# Patient Record
Sex: FEMALE | Race: WHITE | NOT HISPANIC OR LATINO
[De-identification: names, ages, dates, MRNs, and addresses within clinical notes are randomized per-mention and may not be internally consistent; named-entity substitution may affect disease eponyms.]

---

## 2017-06-14 ENCOUNTER — APPOINTMENT (OUTPATIENT)
Dept: HEMATOLOGY ONCOLOGY | Facility: CLINIC | Age: 27
End: 2017-06-14

## 2017-06-14 ENCOUNTER — RESULT REVIEW (OUTPATIENT)
Age: 27
End: 2017-06-14

## 2017-06-14 ENCOUNTER — OUTPATIENT (OUTPATIENT)
Dept: OUTPATIENT SERVICES | Facility: HOSPITAL | Age: 27
LOS: 1 days | Discharge: HOME | End: 2017-06-14

## 2017-06-14 VITALS
HEIGHT: 62.2 IN | TEMPERATURE: 99 F | BODY MASS INDEX: 24.65 KG/M2 | OXYGEN SATURATION: 97 % | WEIGHT: 135.67 LBS | DIASTOLIC BLOOD PRESSURE: 92 MMHG | HEART RATE: 88 BPM | SYSTOLIC BLOOD PRESSURE: 134 MMHG | RESPIRATION RATE: 16 BRPM

## 2017-06-14 DIAGNOSIS — D69.6 THROMBOCYTOPENIA, UNSPECIFIED: ICD-10-CM

## 2017-06-14 DIAGNOSIS — R79.89 OTHER SPECIFIED ABNORMAL FINDINGS OF BLOOD CHEMISTRY: ICD-10-CM

## 2017-06-14 LAB
BASOPHILS # BLD AUTO: 0.1 K/UL — SIGNIFICANT CHANGE UP (ref 0–0.2)
BASOPHILS NFR BLD AUTO: 1.2 % — SIGNIFICANT CHANGE UP (ref 0–2)
EOSINOPHIL # BLD AUTO: 0.3 K/UL — SIGNIFICANT CHANGE UP (ref 0–0.5)
EOSINOPHIL NFR BLD AUTO: 3.7 % — SIGNIFICANT CHANGE UP (ref 0–6)
HCT VFR BLD CALC: 48.3 % — HIGH (ref 34.5–45)
HGB BLD-MCNC: 16 G/DL — HIGH (ref 11.5–15.5)
LYMPHOCYTES # BLD AUTO: 2.4 K/UL — SIGNIFICANT CHANGE UP (ref 1–3.3)
LYMPHOCYTES # BLD AUTO: 29.6 % — SIGNIFICANT CHANGE UP (ref 13–44)
MCHC RBC-ENTMCNC: 27.6 PG — SIGNIFICANT CHANGE UP (ref 27–34)
MCHC RBC-ENTMCNC: 33 GM/DL — SIGNIFICANT CHANGE UP (ref 32–36)
MCV RBC AUTO: 83.5 FL — SIGNIFICANT CHANGE UP (ref 80–100)
MONOCYTES # BLD AUTO: 0.3 K/UL — SIGNIFICANT CHANGE UP (ref 0–0.9)
MONOCYTES NFR BLD AUTO: 4 % — SIGNIFICANT CHANGE UP (ref 2–14)
NEUTROPHILS # BLD AUTO: 5 K/UL — SIGNIFICANT CHANGE UP (ref 1.8–7.4)
NEUTROPHILS NFR BLD AUTO: 61.6 % — SIGNIFICANT CHANGE UP (ref 43–77)
PLATELET # BLD AUTO: 58 K/UL — LOW (ref 150–400)
RBC # BLD: 5.79 M/UL — HIGH (ref 3.8–5.2)
RBC # FLD: 11.1 % — SIGNIFICANT CHANGE UP (ref 10.3–14.5)
WBC # BLD: 8.1 K/UL — SIGNIFICANT CHANGE UP (ref 3.8–10.5)
WBC # FLD AUTO: 8.1 K/UL — SIGNIFICANT CHANGE UP (ref 3.8–10.5)

## 2018-08-03 ENCOUNTER — APPOINTMENT (OUTPATIENT)
Dept: ULTRASOUND IMAGING | Facility: CLINIC | Age: 28
End: 2018-08-03
Payer: COMMERCIAL

## 2018-08-03 ENCOUNTER — OUTPATIENT (OUTPATIENT)
Dept: OUTPATIENT SERVICES | Facility: HOSPITAL | Age: 28
LOS: 1 days | End: 2018-08-03
Payer: COMMERCIAL

## 2018-08-03 DIAGNOSIS — R92.8 OTHER ABNORMAL AND INCONCLUSIVE FINDINGS ON DIAGNOSTIC IMAGING OF BREAST: ICD-10-CM

## 2018-08-03 PROCEDURE — 76642 ULTRASOUND BREAST LIMITED: CPT | Mod: 26,LT

## 2018-08-03 PROCEDURE — 76642 ULTRASOUND BREAST LIMITED: CPT

## 2018-11-21 ENCOUNTER — TRANSCRIPTION ENCOUNTER (OUTPATIENT)
Age: 28
End: 2018-11-21

## 2018-11-24 ENCOUNTER — OUTPATIENT (OUTPATIENT)
Dept: OUTPATIENT SERVICES | Facility: HOSPITAL | Age: 28
LOS: 1 days | End: 2018-11-24
Payer: COMMERCIAL

## 2018-11-24 ENCOUNTER — APPOINTMENT (OUTPATIENT)
Dept: ULTRASOUND IMAGING | Facility: CLINIC | Age: 28
End: 2018-11-24
Payer: COMMERCIAL

## 2018-11-24 DIAGNOSIS — R10.9 UNSPECIFIED ABDOMINAL PAIN: ICD-10-CM

## 2018-11-24 PROCEDURE — 76700 US EXAM ABDOM COMPLETE: CPT | Mod: 26

## 2018-11-24 PROCEDURE — 76700 US EXAM ABDOM COMPLETE: CPT

## 2019-03-12 ENCOUNTER — TRANSCRIPTION ENCOUNTER (OUTPATIENT)
Age: 29
End: 2019-03-12

## 2019-07-01 ENCOUNTER — TRANSCRIPTION ENCOUNTER (OUTPATIENT)
Age: 29
End: 2019-07-01

## 2020-01-06 ENCOUNTER — EMERGENCY (EMERGENCY)
Facility: HOSPITAL | Age: 30
LOS: 1 days | Discharge: HOME | End: 2020-01-06
Attending: EMERGENCY MEDICINE
Payer: COMMERCIAL

## 2020-01-06 VITALS
DIASTOLIC BLOOD PRESSURE: 103 MMHG | RESPIRATION RATE: 18 BRPM | WEIGHT: 138.01 LBS | TEMPERATURE: 99 F | HEIGHT: 62 IN | HEART RATE: 84 BPM | OXYGEN SATURATION: 98 % | SYSTOLIC BLOOD PRESSURE: 160 MMHG

## 2020-01-06 VITALS
RESPIRATION RATE: 18 BRPM | DIASTOLIC BLOOD PRESSURE: 80 MMHG | SYSTOLIC BLOOD PRESSURE: 114 MMHG | HEART RATE: 88 BPM | OXYGEN SATURATION: 98 %

## 2020-01-06 LAB
BASOPHILS # BLD AUTO: 0.07 K/UL — SIGNIFICANT CHANGE UP (ref 0–0.2)
BASOPHILS NFR BLD AUTO: 1.4 % — SIGNIFICANT CHANGE UP (ref 0–2)
EOSINOPHIL # BLD AUTO: 0.18 K/UL — SIGNIFICANT CHANGE UP (ref 0–0.5)
EOSINOPHIL NFR BLD AUTO: 3.5 % — SIGNIFICANT CHANGE UP (ref 0–6)
HCT VFR BLD CALC: 42.8 % — SIGNIFICANT CHANGE UP (ref 34.5–45)
HGB BLD-MCNC: 14.5 G/DL — SIGNIFICANT CHANGE UP (ref 11.5–15.5)
IMM GRANULOCYTES NFR BLD AUTO: 0.2 % — SIGNIFICANT CHANGE UP (ref 0–1.5)
LYMPHOCYTES # BLD AUTO: 1.25 K/UL — SIGNIFICANT CHANGE UP (ref 1–3.3)
LYMPHOCYTES # BLD AUTO: 24.3 % — SIGNIFICANT CHANGE UP (ref 13–44)
MCHC RBC-ENTMCNC: 28.5 PG — SIGNIFICANT CHANGE UP (ref 27–34)
MCHC RBC-ENTMCNC: 33.9 GM/DL — SIGNIFICANT CHANGE UP (ref 32–36)
MCV RBC AUTO: 84.3 FL — SIGNIFICANT CHANGE UP (ref 80–100)
MONOCYTES # BLD AUTO: 0.3 K/UL — SIGNIFICANT CHANGE UP (ref 0–0.9)
MONOCYTES NFR BLD AUTO: 5.8 % — SIGNIFICANT CHANGE UP (ref 2–14)
NEUTROPHILS # BLD AUTO: 3.33 K/UL — SIGNIFICANT CHANGE UP (ref 1.8–7.4)
NEUTROPHILS NFR BLD AUTO: 64.8 % — SIGNIFICANT CHANGE UP (ref 43–77)
PLATELET # BLD AUTO: 61 K/UL — LOW (ref 150–400)
RBC # BLD: 5.08 M/UL — SIGNIFICANT CHANGE UP (ref 3.8–5.2)
RBC # FLD: 12.2 % — SIGNIFICANT CHANGE UP (ref 10.3–14.5)
WBC # BLD: 5.14 K/UL — SIGNIFICANT CHANGE UP (ref 3.8–10.5)
WBC # FLD AUTO: 5.14 K/UL — SIGNIFICANT CHANGE UP (ref 3.8–10.5)

## 2020-01-06 PROCEDURE — 70450 CT HEAD/BRAIN W/O DYE: CPT

## 2020-01-06 PROCEDURE — 96374 THER/PROPH/DIAG INJ IV PUSH: CPT

## 2020-01-06 PROCEDURE — 99284 EMERGENCY DEPT VISIT MOD MDM: CPT | Mod: 25

## 2020-01-06 PROCEDURE — 36415 COLL VENOUS BLD VENIPUNCTURE: CPT

## 2020-01-06 PROCEDURE — 96361 HYDRATE IV INFUSION ADD-ON: CPT

## 2020-01-06 PROCEDURE — 99284 EMERGENCY DEPT VISIT MOD MDM: CPT

## 2020-01-06 PROCEDURE — 85027 COMPLETE CBC AUTOMATED: CPT

## 2020-01-06 PROCEDURE — 70450 CT HEAD/BRAIN W/O DYE: CPT | Mod: 26

## 2020-01-06 RX ORDER — METOCLOPRAMIDE HCL 10 MG
10 TABLET ORAL ONCE
Refills: 0 | Status: COMPLETED | OUTPATIENT
Start: 2020-01-06 | End: 2020-01-06

## 2020-01-06 RX ORDER — SODIUM CHLORIDE 9 MG/ML
1000 INJECTION INTRAMUSCULAR; INTRAVENOUS; SUBCUTANEOUS ONCE
Refills: 0 | Status: COMPLETED | OUTPATIENT
Start: 2020-01-06 | End: 2020-01-06

## 2020-01-06 RX ADMIN — SODIUM CHLORIDE 1000 MILLILITER(S): 9 INJECTION INTRAMUSCULAR; INTRAVENOUS; SUBCUTANEOUS at 07:50

## 2020-01-06 RX ADMIN — Medication 10 MILLIGRAM(S): at 08:36

## 2020-01-06 RX ADMIN — SODIUM CHLORIDE 1000 MILLILITER(S): 9 INJECTION INTRAMUSCULAR; INTRAVENOUS; SUBCUTANEOUS at 08:33

## 2020-01-06 NOTE — ED PROVIDER NOTE - CARE PROVIDER_API CALL
Timothy Henry; PhD)  Neurology; Vascular Neurology  [address] [place] [address]  Phone: [phone]  Fax: [phone]  Follow Up Time: Routine

## 2020-01-06 NOTE — ED PROVIDER NOTE - CLINICAL SUMMARY MEDICAL DECISION MAKING FREE TEXT BOX
30 y/o F neurologically intact, will check platelet count, hydrate and medicate with reglan, reassess.

## 2020-01-06 NOTE — ED PROVIDER NOTE - PATIENT PORTAL LINK FT
You can access the FollowMyHealth Patient Portal offered by [facility] by registering at the following website: [email or web address]. By joining [employer]’s FollowMyHealth portal, you will also be able to view your health information using other applications (apps) compatible with our system.

## 2020-01-06 NOTE — ED PROVIDER NOTE - ATTENDING CONTRIBUTION TO CARE
Priya: I performed a face to face bedside interview with patient regarding history of present illness, review of symptoms and past medical history. I completed an independent physical exam.  I have discussed patient's plan of care with advanced care provider.   I agree with note as stated above including HISTORY OF PRESENT ILLNESS, HIV, PAST MEDICAL/SURGICAL/FAMILY/SOCIAL HISTORY, ALLERGIES AND HOME MEDICATIONS, REVIEW OF SYSTEMS, PHYSICAL EXAM, MEDICAL DECISION MAKING and any PROGRESS NOTES during the time I functioned as the attending physician for this patient  unless otherwise noted. My brief assessment is as follows: hx itp (no treatment, platelets 50k most recently), c/o gradual onset headache intermittently over past few days, worse today. No trauma, no photophobia or neck stiffness. States has some tenderness to back of headache after doing stretching/work out. No n/v/f/c. No neuro symptoms. on exam, very comfortable/well appearing, nad, supple neck, perrla, mild ttp to occiput, cn 2-12 wnl, nl sensation/motor. nl gait, ctab, rrr, abd nt/nd. ct head neg. check cbc for platelets with pt request. reglan, possibly fiorcet if not improved. reassess.

## 2020-01-06 NOTE — ED ADULT TRIAGE NOTE - CHIEF COMPLAINT QUOTE
patient states that she has headache getting worse started weds, feeling pressure to back of right eye

## 2020-07-16 ENCOUNTER — TRANSCRIPTION ENCOUNTER (OUTPATIENT)
Age: 30
End: 2020-07-16

## 2020-07-20 ENCOUNTER — TRANSCRIPTION ENCOUNTER (OUTPATIENT)
Age: 30
End: 2020-07-20

## 2021-08-06 PROBLEM — D69.3 IMMUNE THROMBOCYTOPENIC PURPURA: Chronic | Status: ACTIVE | Noted: 2020-01-06

## 2021-08-06 PROBLEM — E28.2 POLYCYSTIC OVARIAN SYNDROME: Chronic | Status: ACTIVE | Noted: 2020-01-06

## 2021-09-10 ENCOUNTER — OUTPATIENT (OUTPATIENT)
Dept: OUTPATIENT SERVICES | Facility: HOSPITAL | Age: 31
LOS: 1 days | Discharge: HOME | End: 2021-09-10

## 2021-09-10 DIAGNOSIS — D69.6 THROMBOCYTOPENIA, UNSPECIFIED: ICD-10-CM

## 2021-09-13 ENCOUNTER — RESULT REVIEW (OUTPATIENT)
Age: 31
End: 2021-09-13

## 2021-09-13 ENCOUNTER — APPOINTMENT (OUTPATIENT)
Dept: HEMATOLOGY ONCOLOGY | Facility: CLINIC | Age: 31
End: 2021-09-13
Payer: COMMERCIAL

## 2021-09-13 VITALS
BODY MASS INDEX: 24.53 KG/M2 | SYSTOLIC BLOOD PRESSURE: 142 MMHG | HEIGHT: 62.2 IN | HEART RATE: 68 BPM | WEIGHT: 135 LBS | DIASTOLIC BLOOD PRESSURE: 96 MMHG | OXYGEN SATURATION: 100 %

## 2021-09-13 LAB
BASOPHILS # BLD AUTO: 0.1 K/UL — SIGNIFICANT CHANGE UP (ref 0–0.2)
BASOPHILS NFR BLD AUTO: 1.8 % — SIGNIFICANT CHANGE UP (ref 0–2)
EOSINOPHIL # BLD AUTO: 0.3 K/UL — SIGNIFICANT CHANGE UP (ref 0–0.5)
EOSINOPHIL NFR BLD AUTO: 5.5 % — SIGNIFICANT CHANGE UP (ref 0–6)
HCT VFR BLD CALC: 46.9 % — HIGH (ref 34.5–45)
HGB BLD-MCNC: 16.2 G/DL — HIGH (ref 11.5–15.5)
LYMPHOCYTES # BLD AUTO: 1.9 K/UL — SIGNIFICANT CHANGE UP (ref 1–3.3)
LYMPHOCYTES # BLD AUTO: 37.1 % — SIGNIFICANT CHANGE UP (ref 13–44)
MCHC RBC-ENTMCNC: 29.2 PG — SIGNIFICANT CHANGE UP (ref 27–34)
MCHC RBC-ENTMCNC: 34.4 G/DL — SIGNIFICANT CHANGE UP (ref 32–36)
MCV RBC AUTO: 85 FL — SIGNIFICANT CHANGE UP (ref 80–100)
MONOCYTES # BLD AUTO: 0.3 K/UL — SIGNIFICANT CHANGE UP (ref 0–0.9)
MONOCYTES NFR BLD AUTO: 6.1 % — SIGNIFICANT CHANGE UP (ref 2–14)
NEUTROPHILS # BLD AUTO: 2.5 K/UL — SIGNIFICANT CHANGE UP (ref 1.8–7.4)
NEUTROPHILS NFR BLD AUTO: 49.6 % — SIGNIFICANT CHANGE UP (ref 43–77)
PLATELET # BLD AUTO: 72 K/UL — LOW (ref 150–400)
RBC # BLD: 5.52 M/UL — HIGH (ref 3.8–5.2)
RBC # FLD: 11.4 % — SIGNIFICANT CHANGE UP (ref 10.3–14.5)
WBC # BLD: 5.1 K/UL — SIGNIFICANT CHANGE UP (ref 3.8–10.5)
WBC # FLD AUTO: 5.1 K/UL — SIGNIFICANT CHANGE UP (ref 3.8–10.5)

## 2021-09-13 PROCEDURE — 99213 OFFICE O/P EST LOW 20 MIN: CPT

## 2021-09-13 NOTE — ADDENDUM
[FreeTextEntry1] : Documented by Roverto Foreman acting as scribe for Dr. Burgess on 09/13/2021. \par \par All Medical record entries made by the Scribe were at my, Dr. Burgess's, direction and personally dictated by me on 09/13/2021. I have reviewed the chart and agree that the record accurately reflects my personal performance of the history, physical exam, assessment and plan. I have also personally directed, reviewed, and agreed with the discharge instructions.

## 2021-09-13 NOTE — HISTORY OF PRESENT ILLNESS
[de-identified] : Chronic ITP, with prior platelet counts 47,000  - in 6/17 58,000 - no bleeding or bruising. [de-identified] : Planning to start trying for pregnancy\par Concerns about PLT count: Low count in July 45,000\par Has not received COVID vaccine. Positive for COVID in November\par \par \par \par Today's CBC (09/13/2021): WBC 5.1 K/uL, HGB 16.2 g/dL, HCT 46.9 %, PLT 72 K/uL

## 2021-09-13 NOTE — ASSESSMENT
[FreeTextEntry1] : chronic ITP, has not required therapy\par \par Today's CBC (09/13/2021): WBC 5.1 K/uL, HGB 16.2 g/dL, HCT 46.9 %, PLT 72 K/uL\par \par Plan:\par Discussed about COVID vaccine.\par Discussed about pregnancy and will monitor the process\par OB in [place]  \par Advised to call back at first + pregnancy test - we will coordinate care with hi-risk OB re: issues of epidural anesthesia and vaginal delivery

## 2021-09-22 ENCOUNTER — APPOINTMENT (OUTPATIENT)
Dept: HEMATOLOGY ONCOLOGY | Facility: CLINIC | Age: 31
End: 2021-09-22

## 2021-10-21 ENCOUNTER — TRANSCRIPTION ENCOUNTER (OUTPATIENT)
Age: 31
End: 2021-10-21

## 2021-12-31 ENCOUNTER — TRANSCRIPTION ENCOUNTER (OUTPATIENT)
Age: 31
End: 2021-12-31

## 2022-01-07 ENCOUNTER — TRANSCRIPTION ENCOUNTER (OUTPATIENT)
Age: 32
End: 2022-01-07

## 2022-01-13 ENCOUNTER — TRANSCRIPTION ENCOUNTER (OUTPATIENT)
Age: 32
End: 2022-01-13

## 2022-02-18 ENCOUNTER — TRANSCRIPTION ENCOUNTER (OUTPATIENT)
Age: 32
End: 2022-02-18

## 2022-03-25 ENCOUNTER — TRANSCRIPTION ENCOUNTER (OUTPATIENT)
Age: 32
End: 2022-03-25

## 2022-04-04 ENCOUNTER — TRANSCRIPTION ENCOUNTER (OUTPATIENT)
Age: 32
End: 2022-04-04

## 2022-04-18 ENCOUNTER — TRANSCRIPTION ENCOUNTER (OUTPATIENT)
Age: 32
End: 2022-04-18

## 2022-05-08 ENCOUNTER — NON-APPOINTMENT (OUTPATIENT)
Age: 32
End: 2022-05-08

## 2022-09-01 ENCOUNTER — NON-APPOINTMENT (OUTPATIENT)
Age: 32
End: 2022-09-01

## 2022-09-08 ENCOUNTER — NON-APPOINTMENT (OUTPATIENT)
Age: 32
End: 2022-09-08

## 2022-09-16 ENCOUNTER — NON-APPOINTMENT (OUTPATIENT)
Age: 32
End: 2022-09-16

## 2022-09-22 ENCOUNTER — NON-APPOINTMENT (OUTPATIENT)
Age: 32
End: 2022-09-22

## 2022-10-01 ENCOUNTER — NON-APPOINTMENT (OUTPATIENT)
Age: 32
End: 2022-10-01

## 2022-10-08 ENCOUNTER — NON-APPOINTMENT (OUTPATIENT)
Age: 32
End: 2022-10-08

## 2022-10-09 ENCOUNTER — NON-APPOINTMENT (OUTPATIENT)
Age: 32
End: 2022-10-09

## 2022-10-15 ENCOUNTER — NON-APPOINTMENT (OUTPATIENT)
Age: 32
End: 2022-10-15

## 2022-10-23 ENCOUNTER — NON-APPOINTMENT (OUTPATIENT)
Age: 32
End: 2022-10-23

## 2023-04-01 ENCOUNTER — NON-APPOINTMENT (OUTPATIENT)
Age: 33
End: 2023-04-01

## 2023-07-23 ENCOUNTER — OUTPATIENT (OUTPATIENT)
Dept: OUTPATIENT SERVICES | Facility: HOSPITAL | Age: 33
LOS: 1 days | Discharge: HOME | End: 2023-07-23

## 2023-07-23 DIAGNOSIS — D69.6 THROMBOCYTOPENIA, UNSPECIFIED: ICD-10-CM

## 2023-07-26 ENCOUNTER — APPOINTMENT (OUTPATIENT)
Dept: HEMATOLOGY ONCOLOGY | Facility: CLINIC | Age: 33
End: 2023-07-26
Payer: COMMERCIAL

## 2023-07-26 ENCOUNTER — RESULT REVIEW (OUTPATIENT)
Age: 33
End: 2023-07-26

## 2023-07-26 ENCOUNTER — TRANSCRIPTION ENCOUNTER (OUTPATIENT)
Age: 33
End: 2023-07-26

## 2023-07-26 VITALS
WEIGHT: 144.07 LBS | OXYGEN SATURATION: 99 % | HEIGHT: 62 IN | TEMPERATURE: 97.7 F | HEART RATE: 77 BPM | DIASTOLIC BLOOD PRESSURE: 83 MMHG | BODY MASS INDEX: 26.51 KG/M2 | SYSTOLIC BLOOD PRESSURE: 124 MMHG

## 2023-07-26 LAB
BASOPHILS # BLD AUTO: 0.2 K/UL — SIGNIFICANT CHANGE UP (ref 0–0.2)
BASOPHILS NFR BLD AUTO: 1.4 % — SIGNIFICANT CHANGE UP (ref 0–2)
EOSINOPHIL # BLD AUTO: 0.1 K/UL — SIGNIFICANT CHANGE UP (ref 0–0.5)
EOSINOPHIL NFR BLD AUTO: 1 % — SIGNIFICANT CHANGE UP (ref 0–6)
HCT VFR BLD CALC: 44.4 % — SIGNIFICANT CHANGE UP (ref 34.5–45)
HGB BLD-MCNC: 16 G/DL — HIGH (ref 11.5–15.5)
LYMPHOCYTES # BLD AUTO: 19 % — SIGNIFICANT CHANGE UP (ref 13–44)
LYMPHOCYTES # BLD AUTO: 2 K/UL — SIGNIFICANT CHANGE UP (ref 1–3.3)
MCHC RBC-ENTMCNC: 29.6 PG — SIGNIFICANT CHANGE UP (ref 27–34)
MCHC RBC-ENTMCNC: 36 G/DL — SIGNIFICANT CHANGE UP (ref 32–36)
MCV RBC AUTO: 82 FL — SIGNIFICANT CHANGE UP (ref 80–100)
MONOCYTES # BLD AUTO: 0.6 K/UL — SIGNIFICANT CHANGE UP (ref 0–0.9)
MONOCYTES NFR BLD AUTO: 5.2 % — SIGNIFICANT CHANGE UP (ref 2–14)
NEUTROPHILS # BLD AUTO: 7.9 K/UL — HIGH (ref 1.8–7.4)
NEUTROPHILS NFR BLD AUTO: 73.4 % — SIGNIFICANT CHANGE UP (ref 43–77)
PLATELET # BLD AUTO: 164 K/UL — SIGNIFICANT CHANGE UP (ref 150–400)
RBC # BLD: 5.42 M/UL — HIGH (ref 3.8–5.2)
RBC # FLD: 10.2 % — LOW (ref 10.3–14.5)
WBC # BLD: 10.8 K/UL — HIGH (ref 3.8–10.5)
WBC # FLD AUTO: 10.8 K/UL — HIGH (ref 3.8–10.5)

## 2023-07-26 PROCEDURE — 99213 OFFICE O/P EST LOW 20 MIN: CPT

## 2023-07-26 NOTE — HISTORY OF PRESENT ILLNESS
[de-identified] : \par Chronic ITP, with prior platelet counts 47,000 - in 6/17 58,000 - no bleeding or bruising. \par \par  [de-identified] : Jamaal is now 9 weeks pregnant.\par Today's platelet count is up to 164,000.

## 2023-07-26 NOTE — ASSESSMENT
[FreeTextEntry1] : Chronic ITP in a 32-year-old woman, now with a platelet count of 164,000 in the ninth week of pregnancy.\par We will monitor again with CBC in 3 months.

## 2023-07-31 ENCOUNTER — TRANSCRIPTION ENCOUNTER (OUTPATIENT)
Age: 33
End: 2023-07-31

## 2023-10-10 ENCOUNTER — OUTPATIENT (OUTPATIENT)
Dept: OUTPATIENT SERVICES | Facility: HOSPITAL | Age: 33
LOS: 1 days | Discharge: HOME | End: 2023-10-10

## 2023-10-10 DIAGNOSIS — D69.6 THROMBOCYTOPENIA, UNSPECIFIED: ICD-10-CM

## 2023-10-18 ENCOUNTER — APPOINTMENT (OUTPATIENT)
Dept: HEMATOLOGY ONCOLOGY | Facility: CLINIC | Age: 33
End: 2023-10-18
Payer: COMMERCIAL

## 2023-10-18 ENCOUNTER — RESULT REVIEW (OUTPATIENT)
Age: 33
End: 2023-10-18

## 2023-10-18 VITALS
TEMPERATURE: 96.5 F | OXYGEN SATURATION: 99 % | DIASTOLIC BLOOD PRESSURE: 84 MMHG | WEIGHT: 165 LBS | SYSTOLIC BLOOD PRESSURE: 123 MMHG | HEIGHT: 62 IN | BODY MASS INDEX: 30.36 KG/M2 | HEART RATE: 88 BPM

## 2023-10-18 LAB
BASOPHILS # BLD AUTO: 0.1 K/UL — SIGNIFICANT CHANGE UP (ref 0–0.2)
BASOPHILS # BLD AUTO: 0.1 K/UL — SIGNIFICANT CHANGE UP (ref 0–0.2)
BASOPHILS NFR BLD AUTO: 0.9 % — SIGNIFICANT CHANGE UP (ref 0–2)
BASOPHILS NFR BLD AUTO: 0.9 % — SIGNIFICANT CHANGE UP (ref 0–2)
EOSINOPHIL # BLD AUTO: 0.2 K/UL — SIGNIFICANT CHANGE UP (ref 0–0.5)
EOSINOPHIL # BLD AUTO: 0.2 K/UL — SIGNIFICANT CHANGE UP (ref 0–0.5)
EOSINOPHIL NFR BLD AUTO: 2.9 % — SIGNIFICANT CHANGE UP (ref 0–6)
EOSINOPHIL NFR BLD AUTO: 2.9 % — SIGNIFICANT CHANGE UP (ref 0–6)
HCT VFR BLD CALC: 38.4 % — SIGNIFICANT CHANGE UP (ref 34.5–45)
HCT VFR BLD CALC: 38.4 % — SIGNIFICANT CHANGE UP (ref 34.5–45)
HGB BLD-MCNC: 13.8 G/DL — SIGNIFICANT CHANGE UP (ref 11.5–15.5)
HGB BLD-MCNC: 13.8 G/DL — SIGNIFICANT CHANGE UP (ref 11.5–15.5)
LYMPHOCYTES # BLD AUTO: 1.5 K/UL — SIGNIFICANT CHANGE UP (ref 1–3.3)
LYMPHOCYTES # BLD AUTO: 1.5 K/UL — SIGNIFICANT CHANGE UP (ref 1–3.3)
LYMPHOCYTES # BLD AUTO: 19.6 % — SIGNIFICANT CHANGE UP (ref 13–44)
LYMPHOCYTES # BLD AUTO: 19.6 % — SIGNIFICANT CHANGE UP (ref 13–44)
MCHC RBC-ENTMCNC: 30.2 PG — SIGNIFICANT CHANGE UP (ref 27–34)
MCHC RBC-ENTMCNC: 30.2 PG — SIGNIFICANT CHANGE UP (ref 27–34)
MCHC RBC-ENTMCNC: 35.8 G/DL — SIGNIFICANT CHANGE UP (ref 32–36)
MCHC RBC-ENTMCNC: 35.8 G/DL — SIGNIFICANT CHANGE UP (ref 32–36)
MCV RBC AUTO: 84.3 FL — SIGNIFICANT CHANGE UP (ref 80–100)
MCV RBC AUTO: 84.3 FL — SIGNIFICANT CHANGE UP (ref 80–100)
MONOCYTES # BLD AUTO: 0.4 K/UL — SIGNIFICANT CHANGE UP (ref 0–0.9)
MONOCYTES # BLD AUTO: 0.4 K/UL — SIGNIFICANT CHANGE UP (ref 0–0.9)
MONOCYTES NFR BLD AUTO: 5.4 % — SIGNIFICANT CHANGE UP (ref 2–14)
MONOCYTES NFR BLD AUTO: 5.4 % — SIGNIFICANT CHANGE UP (ref 2–14)
NEUTROPHILS # BLD AUTO: 5.3 K/UL — SIGNIFICANT CHANGE UP (ref 1.8–7.4)
NEUTROPHILS # BLD AUTO: 5.3 K/UL — SIGNIFICANT CHANGE UP (ref 1.8–7.4)
NEUTROPHILS NFR BLD AUTO: 71.3 % — SIGNIFICANT CHANGE UP (ref 43–77)
NEUTROPHILS NFR BLD AUTO: 71.3 % — SIGNIFICANT CHANGE UP (ref 43–77)
PLAT MORPH BLD: NORMAL — SIGNIFICANT CHANGE UP
PLAT MORPH BLD: NORMAL — SIGNIFICANT CHANGE UP
PLATELET # BLD AUTO: 109 K/UL — LOW (ref 150–400)
PLATELET # BLD AUTO: 109 K/UL — LOW (ref 150–400)
RBC # BLD: 4.56 M/UL — SIGNIFICANT CHANGE UP (ref 3.8–5.2)
RBC # BLD: 4.56 M/UL — SIGNIFICANT CHANGE UP (ref 3.8–5.2)
RBC # FLD: 12.2 % — SIGNIFICANT CHANGE UP (ref 10.3–14.5)
RBC # FLD: 12.2 % — SIGNIFICANT CHANGE UP (ref 10.3–14.5)
RBC BLD AUTO: NORMAL — SIGNIFICANT CHANGE UP
RBC BLD AUTO: NORMAL — SIGNIFICANT CHANGE UP
WBC # BLD: 7.5 K/UL — SIGNIFICANT CHANGE UP (ref 3.8–10.5)
WBC # BLD: 7.5 K/UL — SIGNIFICANT CHANGE UP (ref 3.8–10.5)
WBC # FLD AUTO: 7.5 K/UL — SIGNIFICANT CHANGE UP (ref 3.8–10.5)
WBC # FLD AUTO: 7.5 K/UL — SIGNIFICANT CHANGE UP (ref 3.8–10.5)

## 2023-10-18 PROCEDURE — 99213 OFFICE O/P EST LOW 20 MIN: CPT

## 2023-11-15 ENCOUNTER — APPOINTMENT (OUTPATIENT)
Dept: HEMATOLOGY ONCOLOGY | Facility: CLINIC | Age: 33
End: 2023-11-15
Payer: COMMERCIAL

## 2023-11-15 ENCOUNTER — RESULT REVIEW (OUTPATIENT)
Age: 33
End: 2023-11-15

## 2023-11-15 VITALS
HEART RATE: 83 BPM | DIASTOLIC BLOOD PRESSURE: 85 MMHG | BODY MASS INDEX: 31.65 KG/M2 | SYSTOLIC BLOOD PRESSURE: 125 MMHG | WEIGHT: 172 LBS | OXYGEN SATURATION: 99 % | TEMPERATURE: 98.2 F | HEIGHT: 62 IN

## 2023-11-15 LAB
BASOPHILS # BLD AUTO: 0.1 K/UL — SIGNIFICANT CHANGE UP (ref 0–0.2)
BASOPHILS # BLD AUTO: 0.1 K/UL — SIGNIFICANT CHANGE UP (ref 0–0.2)
BASOPHILS NFR BLD AUTO: 1 % — SIGNIFICANT CHANGE UP (ref 0–2)
BASOPHILS NFR BLD AUTO: 1 % — SIGNIFICANT CHANGE UP (ref 0–2)
EOSINOPHIL # BLD AUTO: 0.1 K/UL — SIGNIFICANT CHANGE UP (ref 0–0.5)
EOSINOPHIL # BLD AUTO: 0.1 K/UL — SIGNIFICANT CHANGE UP (ref 0–0.5)
EOSINOPHIL NFR BLD AUTO: 1.3 % — SIGNIFICANT CHANGE UP (ref 0–6)
EOSINOPHIL NFR BLD AUTO: 1.3 % — SIGNIFICANT CHANGE UP (ref 0–6)
HCT VFR BLD CALC: 41.2 % — SIGNIFICANT CHANGE UP (ref 34.5–45)
HCT VFR BLD CALC: 41.2 % — SIGNIFICANT CHANGE UP (ref 34.5–45)
HGB BLD-MCNC: 14.2 G/DL — SIGNIFICANT CHANGE UP (ref 11.5–15.5)
HGB BLD-MCNC: 14.2 G/DL — SIGNIFICANT CHANGE UP (ref 11.5–15.5)
LYMPHOCYTES # BLD AUTO: 1.9 K/UL — SIGNIFICANT CHANGE UP (ref 1–3.3)
LYMPHOCYTES # BLD AUTO: 1.9 K/UL — SIGNIFICANT CHANGE UP (ref 1–3.3)
LYMPHOCYTES # BLD AUTO: 18 % — SIGNIFICANT CHANGE UP (ref 13–44)
LYMPHOCYTES # BLD AUTO: 18 % — SIGNIFICANT CHANGE UP (ref 13–44)
MCHC RBC-ENTMCNC: 30.5 PG — SIGNIFICANT CHANGE UP (ref 27–34)
MCHC RBC-ENTMCNC: 30.5 PG — SIGNIFICANT CHANGE UP (ref 27–34)
MCHC RBC-ENTMCNC: 34.5 G/DL — SIGNIFICANT CHANGE UP (ref 32–36)
MCHC RBC-ENTMCNC: 34.5 G/DL — SIGNIFICANT CHANGE UP (ref 32–36)
MCV RBC AUTO: 88.4 FL — SIGNIFICANT CHANGE UP (ref 80–100)
MCV RBC AUTO: 88.4 FL — SIGNIFICANT CHANGE UP (ref 80–100)
MONOCYTES # BLD AUTO: 0.6 K/UL — SIGNIFICANT CHANGE UP (ref 0–0.9)
MONOCYTES # BLD AUTO: 0.6 K/UL — SIGNIFICANT CHANGE UP (ref 0–0.9)
MONOCYTES NFR BLD AUTO: 5.4 % — SIGNIFICANT CHANGE UP (ref 2–14)
MONOCYTES NFR BLD AUTO: 5.4 % — SIGNIFICANT CHANGE UP (ref 2–14)
NEUTROPHILS # BLD AUTO: 7.8 K/UL — HIGH (ref 1.8–7.4)
NEUTROPHILS # BLD AUTO: 7.8 K/UL — HIGH (ref 1.8–7.4)
NEUTROPHILS NFR BLD AUTO: 74.4 % — SIGNIFICANT CHANGE UP (ref 43–77)
NEUTROPHILS NFR BLD AUTO: 74.4 % — SIGNIFICANT CHANGE UP (ref 43–77)
PLATELET # BLD AUTO: 132 K/UL — LOW (ref 150–400)
PLATELET # BLD AUTO: 132 K/UL — LOW (ref 150–400)
RBC # BLD: 4.66 M/UL — SIGNIFICANT CHANGE UP (ref 3.8–5.2)
RBC # BLD: 4.66 M/UL — SIGNIFICANT CHANGE UP (ref 3.8–5.2)
RBC # FLD: 11.9 % — SIGNIFICANT CHANGE UP (ref 10.3–14.5)
RBC # FLD: 11.9 % — SIGNIFICANT CHANGE UP (ref 10.3–14.5)
WBC # BLD: 10.4 K/UL — SIGNIFICANT CHANGE UP (ref 3.8–10.5)
WBC # BLD: 10.4 K/UL — SIGNIFICANT CHANGE UP (ref 3.8–10.5)
WBC # FLD AUTO: 10.4 K/UL — SIGNIFICANT CHANGE UP (ref 3.8–10.5)
WBC # FLD AUTO: 10.4 K/UL — SIGNIFICANT CHANGE UP (ref 3.8–10.5)

## 2023-11-15 PROCEDURE — 99213 OFFICE O/P EST LOW 20 MIN: CPT

## 2023-12-15 ENCOUNTER — OUTPATIENT (OUTPATIENT)
Dept: OUTPATIENT SERVICES | Facility: HOSPITAL | Age: 33
LOS: 1 days | Discharge: HOME | End: 2023-12-15

## 2023-12-15 DIAGNOSIS — D69.6 THROMBOCYTOPENIA, UNSPECIFIED: ICD-10-CM

## 2023-12-20 ENCOUNTER — RESULT REVIEW (OUTPATIENT)
Age: 33
End: 2023-12-20

## 2023-12-20 ENCOUNTER — APPOINTMENT (OUTPATIENT)
Dept: HEMATOLOGY ONCOLOGY | Facility: CLINIC | Age: 33
End: 2023-12-20
Payer: COMMERCIAL

## 2023-12-20 VITALS
HEIGHT: 62 IN | HEART RATE: 89 BPM | BODY MASS INDEX: 32.2 KG/M2 | DIASTOLIC BLOOD PRESSURE: 81 MMHG | SYSTOLIC BLOOD PRESSURE: 120 MMHG | OXYGEN SATURATION: 99 % | WEIGHT: 175 LBS | TEMPERATURE: 97.8 F

## 2023-12-20 LAB
BASOPHILS # BLD AUTO: 0.1 K/UL — SIGNIFICANT CHANGE UP (ref 0–0.2)
BASOPHILS # BLD AUTO: 0.1 K/UL — SIGNIFICANT CHANGE UP (ref 0–0.2)
BASOPHILS NFR BLD AUTO: 1.1 % — SIGNIFICANT CHANGE UP (ref 0–2)
BASOPHILS NFR BLD AUTO: 1.1 % — SIGNIFICANT CHANGE UP (ref 0–2)
EOSINOPHIL # BLD AUTO: 0.1 K/UL — SIGNIFICANT CHANGE UP (ref 0–0.5)
EOSINOPHIL # BLD AUTO: 0.1 K/UL — SIGNIFICANT CHANGE UP (ref 0–0.5)
EOSINOPHIL NFR BLD AUTO: 1.2 % — SIGNIFICANT CHANGE UP (ref 0–6)
EOSINOPHIL NFR BLD AUTO: 1.2 % — SIGNIFICANT CHANGE UP (ref 0–6)
HCT VFR BLD CALC: 41.7 % — SIGNIFICANT CHANGE UP (ref 34.5–45)
HCT VFR BLD CALC: 41.7 % — SIGNIFICANT CHANGE UP (ref 34.5–45)
HGB BLD-MCNC: 14.3 G/DL — SIGNIFICANT CHANGE UP (ref 11.5–15.5)
HGB BLD-MCNC: 14.3 G/DL — SIGNIFICANT CHANGE UP (ref 11.5–15.5)
LYMPHOCYTES # BLD AUTO: 1.8 K/UL — SIGNIFICANT CHANGE UP (ref 1–3.3)
LYMPHOCYTES # BLD AUTO: 1.8 K/UL — SIGNIFICANT CHANGE UP (ref 1–3.3)
LYMPHOCYTES # BLD AUTO: 16.7 % — SIGNIFICANT CHANGE UP (ref 13–44)
LYMPHOCYTES # BLD AUTO: 16.7 % — SIGNIFICANT CHANGE UP (ref 13–44)
MCHC RBC-ENTMCNC: 30.2 PG — SIGNIFICANT CHANGE UP (ref 27–34)
MCHC RBC-ENTMCNC: 30.2 PG — SIGNIFICANT CHANGE UP (ref 27–34)
MCHC RBC-ENTMCNC: 34.3 G/DL — SIGNIFICANT CHANGE UP (ref 32–36)
MCHC RBC-ENTMCNC: 34.3 G/DL — SIGNIFICANT CHANGE UP (ref 32–36)
MCV RBC AUTO: 88.1 FL — SIGNIFICANT CHANGE UP (ref 80–100)
MCV RBC AUTO: 88.1 FL — SIGNIFICANT CHANGE UP (ref 80–100)
MONOCYTES # BLD AUTO: 0.7 K/UL — SIGNIFICANT CHANGE UP (ref 0–0.9)
MONOCYTES # BLD AUTO: 0.7 K/UL — SIGNIFICANT CHANGE UP (ref 0–0.9)
MONOCYTES NFR BLD AUTO: 6.5 % — SIGNIFICANT CHANGE UP (ref 2–14)
MONOCYTES NFR BLD AUTO: 6.5 % — SIGNIFICANT CHANGE UP (ref 2–14)
NEUTROPHILS # BLD AUTO: 8.1 K/UL — HIGH (ref 1.8–7.4)
NEUTROPHILS # BLD AUTO: 8.1 K/UL — HIGH (ref 1.8–7.4)
NEUTROPHILS NFR BLD AUTO: 74.6 % — SIGNIFICANT CHANGE UP (ref 43–77)
NEUTROPHILS NFR BLD AUTO: 74.6 % — SIGNIFICANT CHANGE UP (ref 43–77)
PLATELET # BLD AUTO: 146 K/UL — LOW (ref 150–400)
PLATELET # BLD AUTO: 146 K/UL — LOW (ref 150–400)
RBC # BLD: 4.74 M/UL — SIGNIFICANT CHANGE UP (ref 3.8–5.2)
RBC # BLD: 4.74 M/UL — SIGNIFICANT CHANGE UP (ref 3.8–5.2)
RBC # FLD: 12.1 % — SIGNIFICANT CHANGE UP (ref 10.3–14.5)
RBC # FLD: 12.1 % — SIGNIFICANT CHANGE UP (ref 10.3–14.5)
WBC # BLD: 10.8 K/UL — HIGH (ref 3.8–10.5)
WBC # BLD: 10.8 K/UL — HIGH (ref 3.8–10.5)
WBC # FLD AUTO: 10.8 K/UL — HIGH (ref 3.8–10.5)
WBC # FLD AUTO: 10.8 K/UL — HIGH (ref 3.8–10.5)

## 2023-12-20 PROCEDURE — 99213 OFFICE O/P EST LOW 20 MIN: CPT

## 2023-12-20 NOTE — ASSESSMENT
[FreeTextEntry1] : 33-year-old woman, now 30 weeks pregnant, with a history of chronic ITP, running a platelet count of 146,000. Cleared for vaginal delivery and epidural anesthesia. Check CBC in 1 month.

## 2023-12-20 NOTE — HISTORY OF PRESENT ILLNESS
[de-identified] :     Chronic ITP, with prior platelet counts 47,000 - in 6/17 58,000 - no bleeding or bruising.        Jamaal is now 30 weeks pregnant. Today's platelet count is up to 146,000. Reports failed 1 hour glucose test earlier today She feels fatigued Denies bleeding, bruising. [de-identified] : Continues to do well, with a low normal platelet count, at this point cleared for delivery and epidural anesthesia.

## 2024-01-17 ENCOUNTER — APPOINTMENT (OUTPATIENT)
Dept: HEMATOLOGY ONCOLOGY | Facility: CLINIC | Age: 34
End: 2024-01-17
Payer: COMMERCIAL

## 2024-01-17 ENCOUNTER — RESULT REVIEW (OUTPATIENT)
Age: 34
End: 2024-01-17

## 2024-01-17 VITALS
BODY MASS INDEX: 33.13 KG/M2 | WEIGHT: 180 LBS | HEART RATE: 78 BPM | SYSTOLIC BLOOD PRESSURE: 118 MMHG | DIASTOLIC BLOOD PRESSURE: 81 MMHG | OXYGEN SATURATION: 96 % | HEIGHT: 62 IN

## 2024-01-17 LAB
BASOPHILS # BLD AUTO: 0.1 K/UL — SIGNIFICANT CHANGE UP (ref 0–0.2)
BASOPHILS NFR BLD AUTO: 0.9 % — SIGNIFICANT CHANGE UP (ref 0–2)
EOSINOPHIL # BLD AUTO: 0.2 K/UL — SIGNIFICANT CHANGE UP (ref 0–0.5)
EOSINOPHIL NFR BLD AUTO: 1.3 % — SIGNIFICANT CHANGE UP (ref 0–6)
HCT VFR BLD CALC: 49 % — HIGH (ref 34.5–45)
HGB BLD-MCNC: 16.5 G/DL — HIGH (ref 11.5–15.5)
LYMPHOCYTES # BLD AUTO: 1.8 K/UL — SIGNIFICANT CHANGE UP (ref 1–3.3)
LYMPHOCYTES # BLD AUTO: 13.5 % — SIGNIFICANT CHANGE UP (ref 13–44)
MCHC RBC-ENTMCNC: 30.2 PG — SIGNIFICANT CHANGE UP (ref 27–34)
MCHC RBC-ENTMCNC: 33.6 G/DL — SIGNIFICANT CHANGE UP (ref 32–36)
MCV RBC AUTO: 89.9 FL — SIGNIFICANT CHANGE UP (ref 80–100)
MONOCYTES # BLD AUTO: 1 K/UL — HIGH (ref 0–0.9)
MONOCYTES NFR BLD AUTO: 7.4 % — SIGNIFICANT CHANGE UP (ref 2–14)
NEUTROPHILS # BLD AUTO: 10.2 K/UL — HIGH (ref 1.8–7.4)
NEUTROPHILS NFR BLD AUTO: 76.9 % — SIGNIFICANT CHANGE UP (ref 43–77)
PLATELET # BLD AUTO: 166 K/UL — SIGNIFICANT CHANGE UP (ref 150–400)
RBC # BLD: 5.45 M/UL — HIGH (ref 3.8–5.2)
RBC # FLD: 12.3 % — SIGNIFICANT CHANGE UP (ref 10.3–14.5)
WBC # BLD: 13.2 K/UL — HIGH (ref 3.8–10.5)
WBC # FLD AUTO: 13.2 K/UL — HIGH (ref 3.8–10.5)

## 2024-01-17 PROCEDURE — 99213 OFFICE O/P EST LOW 20 MIN: CPT

## 2024-01-17 NOTE — ADDENDUM
[FreeTextEntry1] : Documented by Holland Dukes acting as scribe for Dr. Burgess on  01/17/2024.       All Medical record entries made by the Scribe were at my, Dr. Burgess's, direction and personally dictated by me on  01/17/2024. I have reviewed the chart and agree that the record accurately reflects my personal performance of the history, physical exam, assessment and plan. I have also personally directed, reviewed, and agreed with the discharge instructions.

## 2024-01-17 NOTE — ASSESSMENT
[FreeTextEntry1] : 33-year-old woman, due 2/23/24, with a history of chronic ITP. Platelets 166K TODAY Cleared for vaginal delivery and epidural anesthesia. Check CBC in 1 month.

## 2024-01-17 NOTE — HISTORY OF PRESENT ILLNESS
[de-identified] :     Chronic ITP, with prior platelet counts 47,000 - in 6/17 58,000 - no bleeding or bruising.        Jamaal is now 30 weeks pregnant. Today's platelet count is up to 146,000. Reports failed 1 hour glucose test earlier today She feels fatigued Denies bleeding, bruising. [de-identified] : 32 y/o pregnant female presenting to the office for follow up and blood work. Due for delivery on February 23, 2024. Continues to do well. Denies any vaginal bleeding or bruising. Currently asymptomatic. Patient is cleared for delivery and epidural anesthesia. Today's platelet count 166K

## 2024-01-22 ENCOUNTER — TRANSCRIPTION ENCOUNTER (OUTPATIENT)
Age: 34
End: 2024-01-22

## 2024-02-06 ENCOUNTER — TRANSCRIPTION ENCOUNTER (OUTPATIENT)
Age: 34
End: 2024-02-06

## 2024-02-09 ENCOUNTER — OUTPATIENT (OUTPATIENT)
Dept: OUTPATIENT SERVICES | Facility: HOSPITAL | Age: 34
LOS: 1 days | Discharge: HOME | End: 2024-02-09

## 2024-02-09 DIAGNOSIS — D69.6 THROMBOCYTOPENIA, UNSPECIFIED: ICD-10-CM

## 2024-02-14 ENCOUNTER — RESULT REVIEW (OUTPATIENT)
Age: 34
End: 2024-02-14

## 2024-02-14 ENCOUNTER — APPOINTMENT (OUTPATIENT)
Dept: HEMATOLOGY ONCOLOGY | Facility: CLINIC | Age: 34
End: 2024-02-14
Payer: COMMERCIAL

## 2024-02-14 VITALS
HEIGHT: 62 IN | HEART RATE: 88 BPM | DIASTOLIC BLOOD PRESSURE: 88 MMHG | BODY MASS INDEX: 34.96 KG/M2 | OXYGEN SATURATION: 97 % | TEMPERATURE: 98.2 F | SYSTOLIC BLOOD PRESSURE: 123 MMHG | WEIGHT: 190 LBS

## 2024-02-14 LAB
BASOPHILS # BLD AUTO: 0.1 K/UL — SIGNIFICANT CHANGE UP (ref 0–0.2)
BASOPHILS NFR BLD AUTO: 0.8 % — SIGNIFICANT CHANGE UP (ref 0–2)
EOSINOPHIL # BLD AUTO: 0.2 K/UL — SIGNIFICANT CHANGE UP (ref 0–0.5)
EOSINOPHIL NFR BLD AUTO: 1.5 % — SIGNIFICANT CHANGE UP (ref 0–6)
HCT VFR BLD CALC: 47.6 % — HIGH (ref 34.5–45)
HGB BLD-MCNC: 16.6 G/DL — HIGH (ref 11.5–15.5)
LYMPHOCYTES # BLD AUTO: 18.7 % — SIGNIFICANT CHANGE UP (ref 13–44)
LYMPHOCYTES # BLD AUTO: 2 K/UL — SIGNIFICANT CHANGE UP (ref 1–3.3)
MCHC RBC-ENTMCNC: 30.5 PG — SIGNIFICANT CHANGE UP (ref 27–34)
MCHC RBC-ENTMCNC: 35 G/DL — SIGNIFICANT CHANGE UP (ref 32–36)
MCV RBC AUTO: 87.2 FL — SIGNIFICANT CHANGE UP (ref 80–100)
MONOCYTES # BLD AUTO: 0.5 K/UL — SIGNIFICANT CHANGE UP (ref 0–0.9)
MONOCYTES NFR BLD AUTO: 5 % — SIGNIFICANT CHANGE UP (ref 2–14)
NEUTROPHILS # BLD AUTO: 7.9 K/UL — HIGH (ref 1.8–7.4)
NEUTROPHILS NFR BLD AUTO: 74 % — SIGNIFICANT CHANGE UP (ref 43–77)
PLATELET # BLD AUTO: 145 K/UL — LOW (ref 150–400)
RBC # BLD: 5.46 M/UL — HIGH (ref 3.8–5.2)
RBC # FLD: 11.9 % — SIGNIFICANT CHANGE UP (ref 10.3–14.5)
WBC # BLD: 10.6 K/UL — HIGH (ref 3.8–10.5)
WBC # FLD AUTO: 10.6 K/UL — HIGH (ref 3.8–10.5)

## 2024-02-14 PROCEDURE — 99213 OFFICE O/P EST LOW 20 MIN: CPT

## 2024-02-14 NOTE — ASSESSMENT
[FreeTextEntry1] : 33-year-old woman, due 2/23/24, with a history of chronic ITP.  Platelets 166K on 1/17/24. TODAY, 2/13/24, platelets are 145K. Cleared for vaginal delivery and epidural anesthesia.  RTO 6-8 weeks postpartum.

## 2024-02-14 NOTE — HISTORY OF PRESENT ILLNESS
[de-identified] :     Chronic ITP, with prior platelet counts 47,000 - in 6/17 58,000 - no bleeding or bruising.        Jamaal is now 30 weeks pregnant. Today's platelet count is up to 146,000. Reports failed 1 hour glucose test earlier today She feels fatigued Denies bleeding, bruising. [de-identified] : 34 y/o pregnant female presenting to the office for follow up and blood work. Due for delivery on February 23, 2024. Continues to do well.  Denies any vaginal bleeding or bruising. Currently asymptomatic. Reports mildly elevated blood pressure within the last week.   1/17/24 platelet count 166K 2/14/24 platelet count 145K

## 2024-02-14 NOTE — ADDENDUM
[FreeTextEntry1] : Documented by Holland Dukes acting as scribe for Dr. Burgess on  02/14/2024.   All Medical record entries made by the Scribe were at my, Dr. Burgess's, direction and personally dictated by me on  02/14/2024. I have reviewed the chart and agree that the record accurately reflects my personal performance of the history, physical exam, assessment and plan. I have also personally directed, reviewed, and agreed with the discharge instructions.

## 2024-02-23 ENCOUNTER — TRANSCRIPTION ENCOUNTER (OUTPATIENT)
Age: 34
End: 2024-02-23

## 2024-02-24 ENCOUNTER — INPATIENT (INPATIENT)
Facility: HOSPITAL | Age: 34
LOS: 2 days | Discharge: HOME | End: 2024-02-27
Payer: COMMERCIAL

## 2024-02-24 VITALS — DIASTOLIC BLOOD PRESSURE: 88 MMHG | HEART RATE: 88 BPM | SYSTOLIC BLOOD PRESSURE: 136 MMHG

## 2024-02-24 DIAGNOSIS — O26.899 OTHER SPECIFIED PREGNANCY RELATED CONDITIONS, UNSPECIFIED TRIMESTER: ICD-10-CM

## 2024-02-24 DIAGNOSIS — O26.893 OTHER SPECIFIED PREGNANCY RELATED CONDITIONS, THIRD TRIMESTER: ICD-10-CM

## 2024-02-24 LAB
ALBUMIN SERPL ELPH-MCNC: 3.8 G/DL — SIGNIFICANT CHANGE UP (ref 3.3–5.2)
ALP SERPL-CCNC: 181 U/L — HIGH (ref 40–120)
ALT FLD-CCNC: 22 U/L — SIGNIFICANT CHANGE UP
ANION GAP SERPL CALC-SCNC: 20 MMOL/L — HIGH (ref 5–17)
APTT BLD: 29.9 SEC — SIGNIFICANT CHANGE UP (ref 24.5–35.6)
AST SERPL-CCNC: 22 U/L — SIGNIFICANT CHANGE UP
BASOPHILS # BLD AUTO: 0.06 K/UL — SIGNIFICANT CHANGE UP (ref 0–0.2)
BASOPHILS NFR BLD AUTO: 0.4 % — SIGNIFICANT CHANGE UP (ref 0–2)
BILIRUB SERPL-MCNC: 0.3 MG/DL — LOW (ref 0.4–2)
BLD GP AB SCN SERPL QL: SIGNIFICANT CHANGE UP
BUN SERPL-MCNC: 11.5 MG/DL — SIGNIFICANT CHANGE UP (ref 8–20)
CALCIUM SERPL-MCNC: 9.7 MG/DL — SIGNIFICANT CHANGE UP (ref 8.4–10.5)
CHLORIDE SERPL-SCNC: 97 MMOL/L — SIGNIFICANT CHANGE UP (ref 96–108)
CO2 SERPL-SCNC: 19 MMOL/L — LOW (ref 22–29)
CREAT SERPL-MCNC: 0.75 MG/DL — SIGNIFICANT CHANGE UP (ref 0.5–1.3)
EGFR: 106 ML/MIN/1.73M2 — SIGNIFICANT CHANGE UP
EOSINOPHIL # BLD AUTO: 0.1 K/UL — SIGNIFICANT CHANGE UP (ref 0–0.5)
EOSINOPHIL NFR BLD AUTO: 0.7 % — SIGNIFICANT CHANGE UP (ref 0–6)
FIBRINOGEN PPP-MCNC: 521 MG/DL — HIGH (ref 200–450)
GLUCOSE SERPL-MCNC: 67 MG/DL — LOW (ref 70–99)
HCT VFR BLD CALC: 49.2 % — HIGH (ref 34.5–45)
HGB BLD-MCNC: 17.3 G/DL — HIGH (ref 11.5–15.5)
IMM GRANULOCYTES NFR BLD AUTO: 0.4 % — SIGNIFICANT CHANGE UP (ref 0–0.9)
INR BLD: 0.83 RATIO — LOW (ref 0.85–1.18)
LYMPHOCYTES # BLD AUTO: 1.7 K/UL — SIGNIFICANT CHANGE UP (ref 1–3.3)
LYMPHOCYTES # BLD AUTO: 11.6 % — LOW (ref 13–44)
MCHC RBC-ENTMCNC: 30.2 PG — SIGNIFICANT CHANGE UP (ref 27–34)
MCHC RBC-ENTMCNC: 35.2 GM/DL — SIGNIFICANT CHANGE UP (ref 32–36)
MCV RBC AUTO: 86 FL — SIGNIFICANT CHANGE UP (ref 80–100)
MONOCYTES # BLD AUTO: 0.78 K/UL — SIGNIFICANT CHANGE UP (ref 0–0.9)
MONOCYTES NFR BLD AUTO: 5.3 % — SIGNIFICANT CHANGE UP (ref 2–14)
NEUTROPHILS # BLD AUTO: 11.96 K/UL — HIGH (ref 1.8–7.4)
NEUTROPHILS NFR BLD AUTO: 81.6 % — HIGH (ref 43–77)
PLATELET # BLD AUTO: 137 K/UL — LOW (ref 150–400)
POTASSIUM SERPL-MCNC: 3.6 MMOL/L — SIGNIFICANT CHANGE UP (ref 3.5–5.3)
POTASSIUM SERPL-SCNC: 3.6 MMOL/L — SIGNIFICANT CHANGE UP (ref 3.5–5.3)
PROT SERPL-MCNC: 6.4 G/DL — LOW (ref 6.6–8.7)
PROTHROM AB SERPL-ACNC: 9.3 SEC — LOW (ref 9.5–13)
RBC # BLD: 5.72 M/UL — HIGH (ref 3.8–5.2)
RBC # FLD: 13.1 % — SIGNIFICANT CHANGE UP (ref 10.3–14.5)
SODIUM SERPL-SCNC: 136 MMOL/L — SIGNIFICANT CHANGE UP (ref 135–145)
T PALLIDUM AB TITR SER: NEGATIVE — SIGNIFICANT CHANGE UP
WBC # BLD: 14.66 K/UL — HIGH (ref 3.8–10.5)
WBC # FLD AUTO: 14.66 K/UL — HIGH (ref 3.8–10.5)

## 2024-02-24 RX ORDER — GENTAMICIN SULFATE 40 MG/ML
430 VIAL (ML) INJECTION ONCE
Refills: 0 | Status: COMPLETED | OUTPATIENT
Start: 2024-02-24 | End: 2024-02-24

## 2024-02-24 RX ORDER — LANOLIN
1 OINTMENT (GRAM) TOPICAL EVERY 6 HOURS
Refills: 0 | Status: DISCONTINUED | OUTPATIENT
Start: 2024-02-24 | End: 2024-02-27

## 2024-02-24 RX ORDER — OXYCODONE HYDROCHLORIDE 5 MG/1
5 TABLET ORAL ONCE
Refills: 0 | Status: DISCONTINUED | OUTPATIENT
Start: 2024-02-24 | End: 2024-02-27

## 2024-02-24 RX ORDER — OXYTOCIN 10 UNIT/ML
333.33 VIAL (ML) INJECTION
Qty: 20 | Refills: 0 | Status: COMPLETED | OUTPATIENT
Start: 2024-02-24 | End: 2024-02-24

## 2024-02-24 RX ORDER — IBUPROFEN 200 MG
600 TABLET ORAL EVERY 6 HOURS
Refills: 0 | Status: COMPLETED | OUTPATIENT
Start: 2024-02-24 | End: 2025-01-22

## 2024-02-24 RX ORDER — ACETAMINOPHEN 500 MG
1000 TABLET ORAL ONCE
Refills: 0 | Status: COMPLETED | OUTPATIENT
Start: 2024-02-24 | End: 2024-02-24

## 2024-02-24 RX ORDER — TETANUS TOXOID, REDUCED DIPHTHERIA TOXOID AND ACELLULAR PERTUSSIS VACCINE, ADSORBED 5; 2.5; 8; 8; 2.5 [IU]/.5ML; [IU]/.5ML; UG/.5ML; UG/.5ML; UG/.5ML
0.5 SUSPENSION INTRAMUSCULAR ONCE
Refills: 0 | Status: DISCONTINUED | OUTPATIENT
Start: 2024-02-24 | End: 2024-02-27

## 2024-02-24 RX ORDER — CHLORHEXIDINE GLUCONATE 213 G/1000ML
1 SOLUTION TOPICAL DAILY
Refills: 0 | Status: DISCONTINUED | OUTPATIENT
Start: 2024-02-24 | End: 2024-02-24

## 2024-02-24 RX ORDER — GENTAMICIN SULFATE 40 MG/ML
430 VIAL (ML) INJECTION ONCE
Refills: 0 | Status: DISCONTINUED | OUTPATIENT
Start: 2024-02-24 | End: 2024-02-24

## 2024-02-24 RX ORDER — DIPHENHYDRAMINE HCL 50 MG
25 CAPSULE ORAL EVERY 6 HOURS
Refills: 0 | Status: DISCONTINUED | OUTPATIENT
Start: 2024-02-24 | End: 2024-02-27

## 2024-02-24 RX ORDER — AMPICILLIN TRIHYDRATE 250 MG
1 CAPSULE ORAL EVERY 4 HOURS
Refills: 0 | Status: DISCONTINUED | OUTPATIENT
Start: 2024-02-24 | End: 2024-02-24

## 2024-02-24 RX ORDER — CITRIC ACID/SODIUM CITRATE 300-500 MG
30 SOLUTION, ORAL ORAL ONCE
Refills: 0 | Status: DISCONTINUED | OUTPATIENT
Start: 2024-02-24 | End: 2024-02-24

## 2024-02-24 RX ORDER — KETOROLAC TROMETHAMINE 30 MG/ML
30 SYRINGE (ML) INJECTION EVERY 6 HOURS
Refills: 0 | Status: DISCONTINUED | OUTPATIENT
Start: 2024-02-24 | End: 2024-02-24

## 2024-02-24 RX ORDER — SIMETHICONE 80 MG/1
80 TABLET, CHEWABLE ORAL EVERY 4 HOURS
Refills: 0 | Status: DISCONTINUED | OUTPATIENT
Start: 2024-02-24 | End: 2024-02-27

## 2024-02-24 RX ORDER — OXYCODONE HYDROCHLORIDE 5 MG/1
5 TABLET ORAL
Refills: 0 | Status: DISCONTINUED | OUTPATIENT
Start: 2024-02-24 | End: 2024-02-27

## 2024-02-24 RX ORDER — SODIUM CHLORIDE 9 MG/ML
1000 INJECTION, SOLUTION INTRAVENOUS
Refills: 0 | Status: DISCONTINUED | OUTPATIENT
Start: 2024-02-24 | End: 2024-02-24

## 2024-02-24 RX ORDER — SODIUM CHLORIDE 9 MG/ML
1000 INJECTION, SOLUTION INTRAVENOUS
Refills: 0 | Status: DISCONTINUED | OUTPATIENT
Start: 2024-02-24 | End: 2024-02-27

## 2024-02-24 RX ORDER — AMPICILLIN TRIHYDRATE 250 MG
2 CAPSULE ORAL ONCE
Refills: 0 | Status: COMPLETED | OUTPATIENT
Start: 2024-02-24 | End: 2024-02-24

## 2024-02-24 RX ORDER — MAGNESIUM HYDROXIDE 400 MG/1
30 TABLET, CHEWABLE ORAL
Refills: 0 | Status: DISCONTINUED | OUTPATIENT
Start: 2024-02-24 | End: 2024-02-27

## 2024-02-24 RX ORDER — ACETAMINOPHEN 500 MG
975 TABLET ORAL
Refills: 0 | Status: DISCONTINUED | OUTPATIENT
Start: 2024-02-24 | End: 2024-02-27

## 2024-02-24 RX ORDER — SCOPALAMINE 1 MG/3D
1 PATCH, EXTENDED RELEASE TRANSDERMAL ONCE
Refills: 0 | Status: DISCONTINUED | OUTPATIENT
Start: 2024-02-24 | End: 2024-02-24

## 2024-02-24 RX ADMIN — Medication 100 MILLIGRAM(S): at 20:39

## 2024-02-24 RX ADMIN — CHLORHEXIDINE GLUCONATE 1 APPLICATION(S): 213 SOLUTION TOPICAL at 11:55

## 2024-02-24 RX ADMIN — SCOPALAMINE 1 PATCH: 1 PATCH, EXTENDED RELEASE TRANSDERMAL at 11:45

## 2024-02-24 RX ADMIN — Medication 200 MILLIGRAM(S): at 12:26

## 2024-02-24 RX ADMIN — SCOPALAMINE 1 PATCH: 1 PATCH, EXTENDED RELEASE TRANSDERMAL at 14:00

## 2024-02-24 RX ADMIN — Medication 200 GRAM(S): at 08:00

## 2024-02-24 RX ADMIN — Medication 1000 MILLIGRAM(S): at 16:00

## 2024-02-24 RX ADMIN — Medication 1000 MILLIUNIT(S)/MIN: at 12:24

## 2024-02-24 RX ADMIN — Medication 975 MILLIGRAM(S): at 20:38

## 2024-02-24 RX ADMIN — SODIUM CHLORIDE 125 MILLILITER(S): 9 INJECTION, SOLUTION INTRAVENOUS at 06:30

## 2024-02-24 RX ADMIN — Medication 1000 MILLIUNIT(S)/MIN: at 16:30

## 2024-02-24 RX ADMIN — Medication 400 MILLIGRAM(S): at 15:00

## 2024-02-24 RX ADMIN — Medication 100 MILLIGRAM(S): at 12:10

## 2024-02-24 NOTE — OB PROVIDER H&P - HISTORY OF PRESENT ILLNESS
33y  at 40w1d weeks GA by LMP consistent with trimester sono who presents to L&D for contractions beginning at 10pm which then became more frequent and more intense around 2am. Reports contractions every 3-5 minutes, 10/10 pain. Pt also reports minimal spotting, notes she had a cervical exam in the office yesterday. Denies leakage of fluid, endorses positive fetal movements. Denies fevers, chills, nausea and vomiting. No other complaints at this time.   ARUN: 24  LMP: 23    Prenatal course complicated by   GBS pos  ITP - followed by heme, states plt levels wnl throughout pregnancy      POB:   PGYN: + h/o PCOS, + h/o abn pap w/ normal f/u testing -fibroids, denies STD hx  PMH: ITP  PSH: Denies  SH: Denies EtOH, tobacco and illicit drug use during this pregnancy; feels safe at home   Meds: PNVs  Allergies: Ceclor         33y  at 40w1d weeks GA by LMP consistent with trimester sono who presents to L&D for contractions beginning at 10pm which then became more frequent and more intense around 2am. Reports contractions every 3-5 minutes, 10/10 pain. Pt also reports minimal spotting, notes she had a cervical exam in the office yesterday. Denies leakage of fluid, endorses positive fetal movements. Denies fevers, chills, nausea and vomiting. No other complaints at this time.   ARUN: 24  LMP: 23    Prenatal course complicated by   GBS pos  ITP - followed by jillian, states plt levels wnl throughout pregnancy      POB:   PGYN: + h/o PCOS, + h/o abn pap w/ normal f/u testing -fibroids, denies STD hx  PMH: ITP  PSH: Denies  SH: Denies EtOH, tobacco and illicit drug use during this pregnancy; feels safe at home   Meds: PNVs  Allergies: Ceclor, OK with penicillin

## 2024-02-24 NOTE — OB PROVIDER LABOR PROGRESS NOTE - NS_OBIHIFHRDETAILS_OBGYN_ALL_OB_FT
after epidural, deceleration down to 50s  after turning to left lateral and bolus: baseline 1  150, minimal variability, no accels., no decels

## 2024-02-24 NOTE — OB RN DELIVERY SUMMARY - NSSELHIDDEN_OBGYN_ALL_OB_FT
[NS_DeliveryAttending1_OBGYN_ALL_OB_FT:[id number]],[NS_DeliveryAssist1_OBGYN_ALL_OB_FT:[id number]],[NS_DeliveryRN_OBGYN_ALL_OB_FT:[id number]]

## 2024-02-24 NOTE — OB PROVIDER H&P - NSLOWPPHRISK_OBGYN_A_OB
No previous uterine incision/[place] Pregnancy/Less than or equal to 4 previous vaginal births/No known bleeding disorder/No history of postpartum hemorrhage/No other PPH risks indicated

## 2024-02-24 NOTE — OB PROVIDER H&P - ASSESSMENT
A/P: 33y  @ 40w1d admitted to L&D for labor at term, non-reassuring FHT  -Admit to L&D  -Consent  -Admission labs  -NPO, except ice chips   -IV fluids  -Labor: Intact. Latent labor. Fifi regularly   -Fetus: Initial category I tracing with two prolonged decelerations, recovered with IV fluids and repositioning Continuous toco and fetal monitoring.   -GBS: Positive, ampicillin for GBS ppx as indicated  -Analgesia: prn  -DVT ppx: Ambulate and SCD's while in bed     Discussed with Dr. Vargas and Dr. Romero

## 2024-02-24 NOTE — OB NEONATOLOGY/PEDIATRICIAN DELIVERY SUMMARY - NSPEDSNEONOTESA_OBGYN_ALL_OB_FT
Requested by DR Vargas to attend a PC/S of a 34y/o  at 40.1 weeks GA secondary to a Cat 2 tracing.......  She had + PNC, is blood type B pos, HIV neg, HBsAg neg, Treponema Antibody neg, Rubella Imm, GBS pos, hx of ITP (last Plt count 137k).  L&D:  AROM aprox 2 hrs PTD with clear fluids. Mom received Amp, Clinda & Gent PTD. No maternal fever.  Baby born vertex with good cry, transferred to warmer, orally suctioned, dried, and examined. Infant showed to father and then transferred to mother for STS.  A/P:  FT female  Transition to Regular Nursery under Peds Hospitalist care. Requested by DR Vargas to attend a PC/S of a 32y/o  at 40.1 weeks GA secondary to a Cat 2 tracing.......  She had + PNC, is blood type B pos, HIV neg, HBsAg neg, Treponema Antibody neg, Rubella Imm, GBS pos, hx of ITP (last Plt count 137k).  L&D:  AROM aprox 2 hrs PTD with clear fluids. Mom received Amp, Clinda & Gent PTD. No maternal fever.  Baby born vertex with good cry, transferred to warmer, orally suctioned, dried, and examined. Infant showed to father and then transferred for STS.  A/P:  FT female  Transition to Regular Nursery under Peds Hospitalist care.

## 2024-02-24 NOTE — OB PROVIDER LABOR PROGRESS NOTE - ASSESSMENT
Cat II tracing - resuscitated  interventions ongoing  BPs low -anesthesia to evaluate  will continue to monitor, will evaluate within 30 minutes for improvement of tracing  discussed with attending Dr Vargas

## 2024-02-24 NOTE — OB PROVIDER H&P - NSHPPHYSICALEXAM_GEN_ALL_CORE
T(C): 36.5 (02-24-24 @ 06:35), Max: 36.5 (02-24-24 @ 05:30)  HR: 88 (02-24-24 @ 05:30) (88 - 88)  BP: 136/88 (02-24-24 @ 05:30) (136/88 - 136/88)  RR: 18 (02-24-24 @ 06:35) (15 - 18)  SpO2: --  Gen: NAD, well-appearing   Abd: Soft, gravid  Ext: non-tender, non-edematous  SVE:  1/90/-2, intact  FHT: baseline 150, moderate variability, + accels, two prolonged decels seen in triage - recovered with repositioning and IV fluids  [place]: den q2-3 minutes T(C): 36.5 (02-24-24 @ 06:35), Max: 36.5 (02-24-24 @ 05:30)  HR: 88 (02-24-24 @ 05:30) (88 - 88)  BP: 136/88 (02-24-24 @ 05:30) (136/88 - 136/88)  RR: 18 (02-24-24 @ 06:35) (15 - 18)  Gen: NAD, well-appearing   Abd: Soft, gravid  Ext: non-tender, non-edematous  SVE:  1/90/-2, intact  FHT: baseline 150, moderate variability, + accels, two prolonged decels seen in triage - recovered with repositioning and IV fluids  Sarah Ann: den q2-3 minutes

## 2024-02-24 NOTE — OB RN PATIENT PROFILE - FALL HARM RISK - PATIENT NEEDS ASSISTANCE
Initial Psychiatric Assessment    Patient: Thom Beckman Date: 2023   : 1987 Attending: No att. providers found   36 year old male      V-visit was conducted using two-way live audio-video technology  Patient confirmed location is in [place]: Yes  Clinician location is in clinic or home location: Yes    Primary Care Provider:  David Zepeda MD    Source of history:  I based this report upon my review of the written electronic medical record, information gathered upon my interview and assessment of the patient's clinical condition.     Chief complaint: \"general mental health, struggling with depression and anxiety for awhile.\"    History of Presenting Illness:     Thom Beckman is a 36 year old male that presents for an initial evaluation and medication management appointment. Patient referred to behavioral health by PCP for Attention deficit hyperactivity disorder (ADHD), unspecified ADHD type; H/O: depression. Reports previously seeing a therapist for a few sessions. Denies previous psychiatric providers. However, previous PCP prescribed lexapro in  and felt apathetic and discontinued it. Per chart, patient is currently not prescribed psychotropics.     Reports depression and anxiety started when he was 16. Denies triggering event. Endorses past passive suicidal ideation. Denies past suicide attempts. Reports general difficulty controlling worrying, muscle tension, difficulty relaxing, feeling tightly wound and high strung. Endorses infrequent panic attacks. States he has been feeing more worried and anxious for the weeks leading up to this appointment. States he sometimes avoids social settings if his social battery is low. Generally avoids social get together's and tends to fear others are looking at him or judging him and finds it uncomfortable. Has little difficulty going to concerts however when the set changes or there is a break he finds this time to be uncomfortable to same reasons.  Denies current illicit substance use or alcohol use. Reports he used to binge drink on the weekends, but none since 2020. States he used to smoke cigarettes for 20 years but quit January 2020. Motivating event was reading a post on [employer] asking people to post what they are proud of. States he couldn't think of anything at that time and had wanted to quit smoking cigarettes for a long time before this. Denies legal, financial issues. Reports housing is stable. Works full time at [employer].    -Mood has been \"anxious\".   -On a scale 0-10, 10 being the worst of symptoms and 0 being none, this patient rates their anxiety at a 7-8/10. denies Panic attacks.   -Depression at a 6-7/10. Denies anhedonia.   -Appetite has been \"pretty good\". States he eats a lot of fruit and vegetables, and meats like chicken. Typically drinks 1 sugar free energy drink in the morning. Reports drinking adequate amounts of water.  -Energy has been \"exhausted all of the time\".   -Sleep has been \"waking up more in the night the last couple of weeks\". Uses CPAP at night.  Typically wakes up at 2-3am and can get back to sleep within 20 minutes. 7-8 hours a night. Reports he sometimes wakes up feeling rested. Having nightmares over the last few weeks but does not typically.   -Concentration has been \"not good\".   -Denies suicidal ideation, with no intent or plan.   -Denies homicidal ideation with no intent or plan.     Psychiatric review of systems:  -Depression symptoms: Patient admitsfeeling dysphoric, hopeless, helpless, irritability, decreased concentration, decreased energy, guilty feelings, decreased interest, anhedonia and poor sleep.  -Anxiety symptoms: patient has had excessive anxiety/worry for at least 6 months, which is difficult to control, causes distress or impairment and is associated with at least 3 symptoms including:. Patient admits symptoms of restlessness, feeling keyed up or on edge, easily fatigued, difficulty concentrating,  muscle tension and sleep disturbance. Endorses history of panic attacks.   -Manic/hypomanic symptoms: The patient denies having grandiose mood , elevated energy, being awake for extended periods of time without the need for sleep, excessive talking, impulsive behavior, hypersexual behavior, racing thoughts and mood swings.   -Social anxiety symptoms: patient has had marked fear/anxiety about social situations in which exposed to possible scrutiny by others, fear of embarrassment, for > 6 months, social situations are avoided or interred with intense fear/anxiety, anxiety is out of proportion to posed threat and causing significant distress or impairment.  -Obsessive-Compulsive Disorder: The patient reporting no clear history of intrusive, bothersome and interfering obsessions of compulsive rituals.  -Attention deficit disorder: Patient reported a school counselor thought he had ADHD but he has not ever been tested. Does report poor concentration and attention but I believe this may be more related to anxiety.  -Post-traumatic stress disorder: The patient reported no history of traumatic events followed by reexperiencing, avoidance, and hyper-arousal symptoms.   -Auditory/Visual hallucinations/Paranoia: Denies  -Trichotillomania: Denies  -Eating disorder symptoms: denies eating disorder symptoms. Patient reported no clear history of bingeing, purging, restricting or other eating disordered behaviors  -Adjustment disorder symptoms: denies any adjustment disorder symptoms.  -Personality symptoms: personality symptoms not assessed.    SUICIDE RISK ASSESSMENT  YES NO If yes, describe    X Suicide attempt in last 24 hour?    X Suicidal thoughts?    X Plan or considering various methods? Describe:     X Access to means?   Specify weapon location     X Indication of substance abuse/dependence?    X Attempts in past?  How many?  Date of most recent:   Method used?     X Any family members, loved ones, friends who committed  suicide?    X Recent deaths, losses, anniversary dates?    X Has made preparations for death?    X Lack of support system?       [] N/A Verbal contract for safety?   X  Patient has no current intent,or plan, but agrees to contact provider if Suicidal Ideation arises.   X  Patient given emergency 24 hour access information.      Past Psych History:   Previous diagnosis: See HPI.  Current therapist: Denies   Previous psychiatrist: Denies   Previous psychiatric hospitalizations: Denies  ECT/TMS: Denies  Previous suicide attempts: Denies  Self-injurious behavior: Denies  Past psychiatric medication trials: Denies  Seizures: Denies  Concussions: Denies    REVIEW OF SYSTEMS:  Constitutional: Denies fever.  Integumentary: Denies rash.  Ears, nose, throat: Denies rhinorrhea and sore throat.  Respiratory: Denies cough,  Gastrointestinal: Denies nausea, vomiting and diarrhea.  Cardiovascular: Denies chest pain, palpitations and shortness of breath,  Musculoskeletal: Denies pain.  Neurological: Denies headache and weakness.  Urological: Denies painful urination    There were no vitals taken for this visit.    ALLERGIES:  No Known Allergies    Medications at Assessment: Prior to assessment medications were reviewed in the electronic record.  Current Outpatient Medications   Medication Sig   • metroNIDAZOLE (METROGEL) 1 % gel Apply 1 application. topically daily.     No current facility-administered medications for this visit.     LABS:  TSH (mcUnits/mL)   Date Value   04/29/2023 1.194     No results found for: \"T4FREE\"  Cholesterol (mg/dL)   Date Value   05/25/2021 133     HDL (mg/dL)   Date Value   05/25/2021 32 (L)     Cholesterol/ HDL Ratio (no units)   Date Value   05/25/2021 4.2     Triglycerides (mg/dL)   Date Value   05/25/2021 262 (H)     LDL (mg/dL)   Date Value   05/25/2021 49     Glucose (mg/dL)   Date Value   04/29/2023 107 (H)     No results found for: \"HGBA1C\"    Past Medical and Current Status:   The following  were reviewed in the electronic record as past history and active problems:  Patient Active Problem List   Diagnosis   • Tobacco use/quit   • Attention deficit hyperactivity disorder (ADHD), predominantly inattentive type   • Annual physical exam   • Anxiety and depression   • Snoring   • Obesity (BMI 30-39.9)   • Rash     Social History:    Social History     Tobacco Use   • Smoking status: Former     Current packs/day: 0.00   • Smokeless tobacco: Never     None/No Preference  Patient grew up in [place] living with mom, sister. States dad was not involved in childhood after he left when patient was 8 years old and he passed away last year due to alcoholism. Reports good relationship with sister and mom. Patient describes childhood as \"good, average\".  Patient's education level includes GED equivalent. Patient states his counselor thought he had ADHD but did not get assessed because of lack insurance.   Patient currently does work full time at [employer], x8 years.   Living situation is stable.   Patient is not in a relationship.   Patient has 0 children.   Patient's hobbies / social activities include cycling, art, movies, read books.   Patient's largest support network involves myself and mom.   Patient denies history of physical, emotional, sexual, or financial abuse. Patient denies history of trauma.    Family History:   Suicide attempts: Denies  Chemical dependence: Father- alcoholism  Mental illness: great great relative might have had dementia or schizophrenia and was told she had shock treatment    Substance Use  • Alcohol: previously drank socially but would binge drink. None since 2020. Denies cravings  • Cocaine/crack: Denies  • Meth: Denies  • Heroin: Denies  • Cannabis: occ use age [age]  • Cigarettes: past use x20 years, stopped in 2020. Reports occasional cravings but tolerable     SERVICE:  []  Yes [x]  No        Deployment: []  Yes  []   No    Honorable Discharge:[]  Yes   []  No  If no to  honorable discharge, describe:     FINANCIAL PROBLEMS:  [x]  None  []  Inability to Pay Bills/Loans  []  Gambling  []  Income Assistance  []  Mounting Bills   []  Loss of Property  []  Bankruptcy     LEGAL PROBLEMS:  [x]  None  []  Operating While Intoxicated/ Driving Under Influence   []  Emergency USP  []  Court Stipulation  []  Protective Custody    []  Charges Pending   []  Pending Court Date (when?)  []  On Probation/[place] (when?)   []  Probation/[place] Office/Telephone Number  []  Professional License Revocation      []  History of Incarceration       []  Divorce/Custody Proceeding    Mental Status Exam:  Patient appears stated age. Patient is alert and fully oriented. Mood \"anxious\". Affect anxious. Well groomed. Good eye contact. No abnormal involuntary movements or gait issues noted. Speech is of normal rate, tone and volume. Thought process is free of any thought blocking or perseveration. Thought content logical and coherent with no delusions. Patient’s perception did not reveal hallucinations. Attention and concentration are unremarkable. Remote and immediate memories are intact. Insight and judgement are fair. Fund of knowledge is average. Patient denies suicidal or homicidal ideations.    Formulation: Thom Beckman presents today for initial appointment. Although patient appears to be somewhat shy and reserved, he does report struggling with anxiety, social anxiety, poor concentration and depression for years. Discussed diagnosis and treatment options. I would like to continue assessing him for adhd but for now would like to focus on reducing anxiety, depression and improve sleep. Discussed medication options and patient is agreeable to start propranolol. Discussed risks vs benefits of medication therapy. Patient understands to contact clinic for questions/concerns and go to urgent care for non-emergent concerns after hours of operation at clinic and emergency department / psychiatric intake  department for emergency / crisis.    Diagnosis: Mild episode of recurrent major depressive disorder (CMD)  (primary encounter diagnosis)  Social anxiety disorder  Generalized anxiety disorder    Orders Placed This Encounter   • propRANolol (INDERAL) 20 MG tablet     Sig: Take 1 tablet by mouth in the morning and 1 tablet in the evening.     Dispense:  60 tablet     Refill:  3       Treatment plan and consent signed: 8/18/2023 sent   Medication consent signed for new medication: 8/18/2023 sent   Controlled substance contract signed: Not needed    Last urine drug screen: Not needed   Next urine drug screen due: Not needed   Medication labs (lipid panel, TSHm, A1c/glucose): Not needed   -lipid panel:  -TSH:  -A1c/glucose:  AIMS:  Not needed.    PLAN  1. Labs: None  2. Medications  a. Propranolol 20mg morning and night  3. Utilize proper sleep routine and sleep hygiene  4. Utilize proper food routine for mood  5. Continue to follow up with other healthcare providers for other health concerns  6. Return for a follow up in about 6 weeks.    Symptoms discussed and validated, support and psycho-education provided. Discussed diagnosis, recommended treatment, alternative treatment options, and the right to refuse treatment. Risks and benefits of psychotropic medications discussed, including but not limited to serotonin syndrome, neuroleptic malignant syndrome, Ralph Jaiden's syndrome, tardive dyskinesia, akathesia, movement disorders, gastrointestinal distress, headaches, dizziness, allergy/rash, metabolic syndrome, weight gain, sexual dysfunction, increased suicidal ideation and seizure. Risks of combining medications with alcohol/illicits discussed. Patient was given the opportunity to ask questions; there were no educational barriers to learning and all questions were answered. Patient verbalized understanding of medication education and training, voiced understanding of treatment plan, acceptance of potential risks  and consequences, and has provided informed consent and agreed to take these medications.     ------------------------------------------------------------------------------------------------------------------  Patient given appropriate number to contact-at [facility] -[phone]     Patient is advised to go to the nearest emergency room or call 911 for emergency / crisis.     This information is confidential and disclosure without patient consent or statutory authorization is prohibited by law.    Note to patient: The 21st Century Cures Act makes medical notes like these available to patients in the interest of transparency; however, be advised this is a medical document. It is intended as peer to peer communication. It is written in medical language and may contain abbreviations or verbiage that are unfamiliar. It may appear blunt or direct. Medical documents are intended to carry relevant information, facts as evident to the provider and the clinical opinion of the practitioner.    SHIMA Mcclain   No assistance needed

## 2024-02-24 NOTE — OB PROVIDER H&P - ATTENDING COMMENTS
As above, 33yr old  presented to L&D in early labor (painful reg CTXs, cervix 1cm dilated) with a prolonged deceleration in triage, excellent recovery.  Pt admitted for expectant management.  Ampicillin for known +GBS (allergic to Ceclor, OK with penicillins).  Abnormal FHR tracing explained/reviewed with pt, her , and her  at length.  OK for expectant management and plan for vaginal delivery but possible need for C/S understood.  R/B/A reviewed.

## 2024-02-24 NOTE — OB RN TRIAGE NOTE - NSICDXPASTMEDICALHX_GEN_ALL_CORE_FT
PAST MEDICAL HISTORY:  Idiopathic thrombocytopenia purpura     PCOS (polycystic ovarian syndrome)

## 2024-02-24 NOTE — OB RN PATIENT PROFILE - NSSDOHINSULT_OBGYN_A_OB
Preop cbc (H/H 9.1/29.9), bmp (elevated BUN/Cr 38/1.57), pt/inr, ptt, ua wnl; outpatient nasal swab neg for MRSA  preop cxr wnl per clearance  preop ekg wnl per clearance  preop echo wnl  preop stress test wnl
never

## 2024-02-24 NOTE — OB RN INTRAOPERATIVE NOTE - NS_DELIVERYANESTHES_OBGYN_ALL_OB_FT
DR TAVO [facility] Daily Progress     Patient Name: Esther Prince  Date: 4/21/2021  Visit #: 3/4-8  PTA visit #:    Referral Diagnosis: Pain of left lower leg   Referring provider: Lashonda José MD  Visit Diagnosis:     ICD-10-CM    1. Radicular pain of left lower extremity  M54.10    2. Left hip pain  M25.552    3. Decreased range of motion of left lower extremity  M25.662      R THR 5 years ago     Assessment:   From Evaluation: Esther is a 66 year old female that has been having pain from her left buttock and radiating distally along lateral left thigh, left knee and occasionally to her left lower leg.  She denies parasthesia.  She is very active and reports that she had walked 2.5 miles from her house around [facility] and since then has had a lot of left buttock/hip pain and the worst is when it is keeping her up at night.  She will take 4-5 ibuprofen and walk around at night when the pain is that intense.  If she walks a little bit within the house the pain is not so bad and During day manageable , but at night gets really painful and she struggles to sleep.  It also starts really hurting if she walks more than a mile. She has not stopped her exercises and is very active daily including using the [employer], long hikes at [employer], [employer] ski in the winter and will begin road biking and paddling as the weather warms up.  She hopes to be biking 60-70 miles soon and will start with 10-12 miles early on.      HEP/POC compliance is  good .  Patient demonstrates understanding/independence with home program.  Patient is benefitting from skilled physical therapy and is making steady progress toward functional goals.   No change in symptoms since last session. Symptoms consistent with piriformis syndrome. Started Counterstrain today.  Pt has been compliant with HEP. Will trial 2 more sessions - if no change in symptoms -recommended return to MD.     Goal Status:  Pt. will demonstrate/verbalize independence in  self-management of condition in : 12 weeks  Pt. will be independent with home exercise program in : 12 weeks  Pt. will report decreased intensity, frequency of : Pain;in 12 weeks;Comment  Comment:: of left buttock and radiating left lateral leg pain to knee/calf  Pt. will have improved quality of sleep: with less pain;waking less times/night;in 12 weeks;Comment  Comment:: wake less than 2x/night  Patient will return to: exercise;for 1 hour;in 12 weeks;Comment  Comment: able to do all exercises desired of hiking, elliptical, road biking, paddling without increased pain at night    Patient will increase : LEFS score;in 12 weeks;by _ points  by ___ points: 4      Plan / Patient Education:     Continue with initial plan of care.  Progress with home program as tolerated.   Plan for next session:Relief with stretching and use of ball at night when in more pain?  Counterstrain.     Subjective:   Burning sensation when it is acute - starts in piriformis area and travels down lateral leg into lateral knee - at times travels into lateral lower leg. Worse at night.     As tried stretching and ice before bed with no improvement overnight.   Pain Rating: 3 -4 - woke her up 1x last night.   Pain in the middle of night 8-9/10.  Worst at night when laying in bed for a while.   Biking does not bother her too much.   Pain after walking 2.5 miles.   Sitting >15-20 min has to move around     Pain started in 2019 after a fall on hips.  Pain resolved but returned recently but not as severe.     Objective:   SLR: 85 tight HS on L   R 80 due to FINA     Counterstrain PLL and ALL scans   PLLT8 on R   PLLL3 on L     Continued to discuss throughout session:   Stretching in the evening   Recommended ice before going to bed 20 min   Trial tennis ball and stretching in the middle of the night to relieve pain.    Exercises:  Exercise #1: supine piriformis stretch (left ankle over right knee) can add pull of left knee to opposite shoulder 20-30  seconds 2x; 2x/day - recommended Right foot on wall or pull right leg into chest.  Can also push L knee away for increased stretch  Comment #1: clam shell for left hip 5-10x daily - added S/L hip abdcution 2-3 sets 10-15 reps  Exercise #2: pigeon on left side only  Comment #2: add prone hip extension  Exercise #3: add bridging  Comment #3: add seated hamstring stretch  *pigeon today     Treatment Today   TREATMENT MINUTES COMMENTS   Evaluation     Self-care/ Home management     Manual therapy 20 STM to L piriformis in prone. See above    Neuromuscular Re-education     Therapeutic Activity     Therapeutic Exercises 8 See flow sheet    Gait training     Modality__________________                Total 28    Blank areas are intentional and mean the treatment did not include these items.       Natalia Hunter, PT, DPT  4/21/2021
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
## 2024-02-24 NOTE — OB RN PATIENT PROFILE - NS_PRENATALCARE_OBGYN_ALL_OB
Patient admitted to [facility] PACU unit. Oriented to room and call light. Safety interventions in place.   SUBJECTIVE:     Kwame Valerio is a 8 year old male, here for a routine health maintenance visit.    Patient was roomed by: Ann Marie Jones Child     Social History  Patient accompanied by:  Father  Questions or concerns?: No    Forms to complete? No  Child lives with::  Mother, father and sister  Who takes care of your child?:  Home with family member, school and   Languages spoken in the home:  English  Recent family changes/ special stressors?:  None noted    Safety / Health Risk  Is your child around anyone who smokes?  No    TB Exposure:     No TB exposure    Child always wear seatbelt?  Yes  Helmet worn for bicycle/roller blades/skateboard?  Yes    Home Safety Survey:      Firearms in the home?: No       Child ever home alone?  No     Parents monitor screen use?  Yes    Daily Activities      Diet and Exercise     Child gets at least 4 servings fruit or vegetables daily: Yes    Consumes beverages other than lowfat white milk or water: YES       Other beverages include: soda or pop    Dairy/calcium sources: 1% milk    Calcium servings per day: >3    Child gets at least 60 minutes per day of active play: Yes    TV in child's room: No    Sleep       Sleep concerns: no concerns- sleeps well through night     Bedtime: 20:30     Wake time on school day: 06:00     Sleep duration (hours): 9    Elimination  Normal urination    Media     Types of media used: iPad, computer, video/dvd/tv and computer/ video games    Daily use of media (hours): 3    Activities    Activities: age appropriate activities, playground, rides bike (helmet advised), music and other    Organized/ Team sports: gymnastics, skiing, tennis and other    School    Name of school: [employer]    Grade level: 5th    School performance: doing well in school    Grades: A & B equivalent    Schooling concerns? No    Days missed current/ last year: 0    Academic problems: no problems in reading, no problems in mathematics, no problems  "in writing and no learning disabilities     Behavior concerns: no current behavioral concerns in school    Dental    Water source:  City water and bottled water    Dental provider: patient has a dental home    Dental exam in last 6 months: Yes     No dental risks    Sports Physical Questionnaire  Sports physical needed: No      {Reference  [facility] Pediatric TB Risk Assessment & Follow-Up Options :061098}    Dental visit recommended: {C&TC:183529::\"Yes\"}  {DENTAL VARNISH- C&TC/AAP recommended (F2 to skip):173191}    Cardiac risk assessment:     Family history (males <55, females <65) of angina (chest pain), heart attack, heart surgery for clogged arteries, or stroke: { :868339::\"no\"}    Biological parent(s) with a total cholesterol over 240:  { :783134::\"no\"}  Dyslipidemia risk:    {Obtain 2 fasting lipid panels at least 2 weeks apart if any of the following apply :992939::\"None\"}     VISION {Required by C&TC:616954}    HEARING {Required by C&TC:870345}    MENTAL HEALTH  Screening:  {PSC done?   PSC referral cutoff = 28   Y-PSC referral cutoff = 30   PSC-17 referral cutoff = 15  :254361}  {.:269592::\"No concerns\"}    {Female Menstrual History (Optional):717664}    PROBLEM LIST  Patient Active Problem List   Diagnosis     Elevated blood lead level     Seasonal allergic rhinitis due to pollen     Need for vaccination     Encounter for routine child health examination w/o abnormal findings     MEDICATIONS  Current Outpatient Medications   Medication Sig Dispense Refill     fluticasone (FLONASE) 50 MCG/ACT nasal spray Spray 1 spray into both nostrils daily 16 g 1     ibuprofen (ADVIL,MOTRIN) 100 MG/5ML suspension Take 10 mg/kg by mouth every 8 hours as needed.       loratadine (CLARITIN) 10 MG tablet Take 1 tablet (10 mg) by mouth daily 30 tablet 1      ALLERGY  No Known Allergies    IMMUNIZATIONS  Immunization History   Administered Date(s) Administered     DTAP (<7y) 2010, 2010, 01/18/2011, 10/19/2011     " "DTAP-IPV, <7Y 07/07/2014     HEPA 02/08/2012, 08/30/2012     HepA-ped 2 Dose 02/08/2012, 08/30/2012     HepB 2010, 2010, 01/18/2011     Hib (PRP-T) 2010, 2010, 01/18/2011, 10/19/2011     Influenza (IIV3) PF 01/18/2011, 10/19/2011, 08/30/2012, 11/04/2013     Influenza Vaccine IM > 6 months Valent IIV4 11/04/2013, 10/12/2019     MMR 07/05/2011, 07/07/2014     Pneumo Conj 13-V (2010&after) 01/18/2011, 04/18/2011, 07/05/2011     Pneumococcal (PCV 7) 2010     Poliovirus, inactivated (IPV) 2010, 2010, 01/18/2011     Rho(d) Unspecified 11/03/2018     Varicella 07/05/2011, 07/07/2014       HEALTH HISTORY SINCE LAST VISIT  {[facility] 1:936858::\"No surgery, major illness or injury since last physical exam\"}    ROS  {ROS Choices:947778}    OBJECTIVE:   EXAM  There were no vitals taken for this visit.  No height on file for this encounter.  No weight on file for this encounter.  No height and weight on file for this encounter.  No blood pressure reading on file for this encounter.  {TEEN GENERAL EXAM 9 - 18 Y:199460::\"GENERAL: Active, alert, in no acute distress.\",\"SKIN: Clear. No significant rash, abnormal pigmentation or lesions\",\"HEAD: Normocephalic\",\"EYES: Pupils equal, round, reactive, Extraocular muscles intact. Normal conjunctivae.\",\"EARS: Normal canals. Tympanic membranes are normal; gray and translucent.\",\"NOSE: Normal without discharge.\",\"MOUTH/THROAT: Clear. No oral lesions. Teeth without obvious abnormalities.\",\"NECK: Supple, no masses.  No thyromegaly.\",\"LYMPH NODES: No adenopathy\",\"LUNGS: Clear. No rales, rhonchi, wheezing or retractions\",\"HEART: Regular rhythm. Normal S1/S2. No murmurs. Normal pulses.\",\"ABDOMEN: Soft, non-tender, not distended, no masses or hepatosplenomegaly. Bowel sounds normal. \",\"NEUROLOGIC: No focal findings. Cranial nerves grossly intact: DTR's normal. Normal gait, strength and tone\",\"BACK: Spine is straight, no scoliosis.\",\"EXTREMITIES: Full range " "of motion, no deformities\"}  {/Sports exams:872402}    ASSESSMENT/PLAN:   {Diagnosis Picklist:553579}    Anticipatory Guidance  {Anticipatory 6 -11y:148466::\"The following topics were discussed:\",\"SOCIAL/ FAMILY:\",\"NUTRITION:\",\"HEALTH/ SAFETY:\"}    Preventive Care Plan  Immunizations    {VACCINE COUNSELING IS EXPECTED WHEN VACCINES ARE GIVEN FOR THE FIRST TIME. SELECT FIRST LINE.    Vaccine counseling would not be expected for subsequent vaccines (after the first of the series) unless there is significant additional documentation:573506::\"Reviewed, up to date\"}  Referrals/Ongoing Specialty care: {C&TC :055844::\"No \"}  See other orders in [facility].  Cleared for sports:  {Yes No Not addressed:479956::\"Not addressed\"}  BMI at No height and weight on file for this encounter.  {BMI Evaluation - If BMI >/= 85th percentile for age, complete Obesity Action Plan:698941::\"No weight concerns.\"}    FOLLOW-UP:    {  (Optional):226025::\"in 1 year for a Preventive Care visit\"}    Resources  HPV and Cancer Prevention:  What Parents Should Know  What Kids Should Know About HPV and Cancer  Goal Tracker: Be More Active  Goal Tracker: Less Screen Time  Goal Tracker: Drink More Water  Goal Tracker: Eat More Fruits and Veggies  [place] Child and Teen Checkups (C&TC) Schedule of Age-Related Screening Standards    Mari Chan MD  [facility]  " Yes

## 2024-02-24 NOTE — OB RN DELIVERY SUMMARY - NS_SEPSISRSKCALC_OBGYN_ALL_OB_FT
EOS calculated successfully. EOS Risk Factor: 0.5/1000 live births ([facility] national incidence); GA=40w1d; Temp=98.06; ROM=1.55; GBS='Positive'; Antibiotics='GBS specific antibiotics > 2 hrs prior to birth'

## 2024-02-24 NOTE — OB PROVIDER DELIVERY SUMMARY - NSPROVIDERDELIVERYNOTE_OBGYN_ALL_OB_FT
Pt was taken tot he OR for NRFHT in labor (several prolonged decels with lack of accels once recovered).  Cervic 4cm at time of C/S.  C/S performed by Dr TREY Vargas, assist Dr MARIO Adorno, PGY-1 under epi-spinal anesthesia.  Delivered live female infant, direct OA, no nuchal cord, through clear amniotic fluid at 12:23pm.  [place] APGAR 9/9, weight 6lb 11oz.  Uterus repaired wihtout exteriorization.  CBR cord blood and tissue collection performed.  Skin-to-skin was initiated in the OR and continued into RR.  No intraoperative complications.  Baby later admitted to [facility], mother stable,  EBL 413cc, UO 225cc. Pt was taken tot he OR for NRFHT in labor (several prolonged decels  with lack of accels once recovered). Cervix 4cm at time of C/S.  C/S performed by Dr TREY Vargas, assist Dr MARIO Adorno, PGY-1 under epi-spinal anesthesia.  Delivered live female infant, direct OA, no nuchal cord, through clear amniotic fluid at 12:23pm.  [place] APGAR 9/9, weight 6lb 11oz.  Uterus repaired wihtout exteriorization.  CBR cord blood and tissue collection performed.  Skin-to-skin was initiated in the OR and continued into RR.  No intraoperative complications.  Baby later admitted to [facility], mother stable,  EBL 413cc, UO 225cc.

## 2024-02-25 ENCOUNTER — TRANSCRIPTION ENCOUNTER (OUTPATIENT)
Age: 34
End: 2024-02-25

## 2024-02-25 LAB
APTT BLD: 26.8 SEC — SIGNIFICANT CHANGE UP (ref 24.5–35.6)
BASOPHILS # BLD AUTO: 0.07 K/UL — SIGNIFICANT CHANGE UP (ref 0–0.2)
BASOPHILS NFR BLD AUTO: 0.6 % — SIGNIFICANT CHANGE UP (ref 0–2)
EOSINOPHIL # BLD AUTO: 0.08 K/UL — SIGNIFICANT CHANGE UP (ref 0–0.5)
EOSINOPHIL NFR BLD AUTO: 0.6 % — SIGNIFICANT CHANGE UP (ref 0–6)
FIBRINOGEN PPP-MCNC: 461 MG/DL — HIGH (ref 200–450)
HCT VFR BLD CALC: 37 % — SIGNIFICANT CHANGE UP (ref 34.5–45)
HGB BLD-MCNC: 13.2 G/DL — SIGNIFICANT CHANGE UP (ref 11.5–15.5)
IMM GRANULOCYTES NFR BLD AUTO: 0.3 % — SIGNIFICANT CHANGE UP (ref 0–0.9)
INR BLD: 0.89 RATIO — SIGNIFICANT CHANGE UP (ref 0.85–1.18)
LYMPHOCYTES # BLD AUTO: 1.61 K/UL — SIGNIFICANT CHANGE UP (ref 1–3.3)
LYMPHOCYTES # BLD AUTO: 12.7 % — LOW (ref 13–44)
MCHC RBC-ENTMCNC: 31.1 PG — SIGNIFICANT CHANGE UP (ref 27–34)
MCHC RBC-ENTMCNC: 35.7 GM/DL — SIGNIFICANT CHANGE UP (ref 32–36)
MCV RBC AUTO: 87.3 FL — SIGNIFICANT CHANGE UP (ref 80–100)
MONOCYTES # BLD AUTO: 0.86 K/UL — SIGNIFICANT CHANGE UP (ref 0–0.9)
MONOCYTES NFR BLD AUTO: 6.8 % — SIGNIFICANT CHANGE UP (ref 2–14)
NEUTROPHILS # BLD AUTO: 9.97 K/UL — HIGH (ref 1.8–7.4)
NEUTROPHILS NFR BLD AUTO: 79 % — HIGH (ref 43–77)
PLATELET # BLD AUTO: 113 K/UL — LOW (ref 150–400)
PROTHROM AB SERPL-ACNC: 9.9 SEC — SIGNIFICANT CHANGE UP (ref 9.5–13)
RBC # BLD: 4.24 M/UL — SIGNIFICANT CHANGE UP (ref 3.8–5.2)
RBC # FLD: 13.3 % — SIGNIFICANT CHANGE UP (ref 10.3–14.5)
WBC # BLD: 12.63 K/UL — HIGH (ref 3.8–10.5)
WBC # FLD AUTO: 12.63 K/UL — HIGH (ref 3.8–10.5)

## 2024-02-25 RX ORDER — ACETAMINOPHEN 500 MG
3 TABLET ORAL
Qty: 60 | Refills: 0
Start: 2024-02-25 | End: 2024-02-29

## 2024-02-25 RX ORDER — OXYCODONE HYDROCHLORIDE 5 MG/1
1 TABLET ORAL
Qty: 8 | Refills: 0
Start: 2024-02-25

## 2024-02-25 RX ORDER — IBUPROFEN 200 MG
1 TABLET ORAL
Qty: 20 | Refills: 0
Start: 2024-02-25 | End: 2024-02-29

## 2024-02-25 RX ADMIN — Medication 975 MILLIGRAM(S): at 14:35

## 2024-02-25 RX ADMIN — Medication 975 MILLIGRAM(S): at 02:55

## 2024-02-25 RX ADMIN — Medication 975 MILLIGRAM(S): at 08:52

## 2024-02-25 RX ADMIN — Medication 975 MILLIGRAM(S): at 20:42

## 2024-02-25 NOTE — DISCHARGE NOTE OB - PATIENT PORTAL LINK FT
You can access the FollowMyHealth Patient Portal offered by [facility] by registering at the following website: [email or web address]. By joining [employer]’s FollowMyHealth portal, you will also be able to view your health information using other applications (apps) compatible with our system.

## 2024-02-25 NOTE — PROGRESS NOTE ADULT - ASSESSMENT
A/P:  SHARA MCWILLIAMS is a 33y  now POD#1 s/p primary  section at 40w1d gestation for nrFHT. Uncomplicated delivery.   -Vital signs stable  -Hgb: 17.3 -> AM labs pending   -Voiding, tolerating PO  -Advance care as tolerated   -Continue routine postpartum and postoperative care and education  -Healthy female infant  -DVT ppx: SCDs in place, lovenox  -F/u coags labs  -Dispo: Anticipate discharge to home tomorrow pending attending approval. A/P:  SHARA MCWILLIAMS is a 33y  now POD#1 s/p primary  section at 40w1d gestation for nrFHT. Uncomplicated delivery.   -Vital signs stable  -Hgb: 17.3 -> AM labs pending   -Voiding, tolerating PO  -Advance care as tolerated   -Continue routine postpartum and postoperative care and education  -Healthy female infant  -DVT ppx: SCDs in place  -F/u coags labs  -Dispo: Anticipate discharge to home tomorrow pending attending approval.

## 2024-02-25 NOTE — DISCHARGE NOTE OB - MEDICATION SUMMARY - MEDICATIONS TO STOP TAKING
Class II - visualization of the soft palate, fauces, and uvula
I will STOP taking the medications listed below when I get home from the hospital:    butalbital/acetaminophen/caffeine 50 mg-325 mg-40 mg oral tablet  -- 2 tab(s) by mouth 4 times a day  -- Do not drink alcoholic beverages when taking this medication.  May cause drowsiness.  Alcohol may intensify this effect.  Use care when operating dangerous machinery.  This product contains acetaminophen.  Do not use  with any other product containing acetaminophen to prevent possible liver damage.

## 2024-02-25 NOTE — PROGRESS NOTE ADULT - SUBJECTIVE AND OBJECTIVE BOX
SHARA MCWILLIAMS is a 33y  now POD#1 s/p primary  section at 40w1d gestation for nrFHT. Uncomplicated delivery.     S:    No acute events overnight.   The patient has no complaints.  Pain controlled with current treatment regimen.   She is ambulating without difficulty and tolerating PO.   + flatus/-BM/+ voiding   She endorses appropriate lochia, which is decreasing.   She is breastfeeding without difficulty.   She denies fevers, chills, nausea and vomiting.   She denies lightheadedness, dizziness, palpitations, chest pain and SOB.     O:    T(C): 36.7 (24 @ 04:21), Max: 37.2 (24 @ 16:10)  HR: 82 (24 @ 04:21) (76 - 116)  BP: 112/74 (24 @ 04:21) (104/74 - 153/85)  RR: 18 (24 @ 04:21) (16 - 18)  SpO2: 95% (24 @ 04:21) (94% - 100%)    Gen: NAD, AOx3, resting comfortably on room air   Abdomen:  Soft, non-tender, non-distended  Incision: Clean/dry/intact with staples in place  Uterus:  Fundus firm below umbilicus  VE:  Expected lochia  Ext:  b/l LE non-tender                           17.3   14.66 )-----------( 137      ( 2024 07:20 )             49.2         136  |  97  |  11.5  ----------------------------<  67<L>  3.6   |  19.0<L>  |  0.75    Ca    9.7      2024 12:10    TPro  6.4<L>  /  Alb  3.8  /  TBili  0.3<L>  /  DBili  x   /  AST  22  /  ALT  22  /  AlkPhos  181<H>                              17.3   14.66 )-----------( 137      ( 2024 07:20 )             49.2          SHARA MCWILLIAMS is a 33y  now POD#1 s/p primary  section at 40w1d gestation for nrFHT. Uncomplicated.    S:    No acute events overnight.   The patient has no complaints.  Pain controlled with current treatment regimen.   She is ambulating without difficulty and tolerating PO.   + flatus/-BM/+ voiding   She endorses appropriate lochia, which is decreasing.   She is breastfeeding without difficulty.   She denies fevers, chills, nausea and vomiting.   She denies lightheadedness, dizziness, palpitations, chest pain and SOB.     O:    T(C): 36.7 (24 @ 04:21), Max: 37.2 (24 @ 16:10)  HR: 82 (24 @ 04:21) (76 - 116)  BP: 112/74 (24 @ 04:21) (104/74 - 153/85)  RR: 18 (24 @ 04:21) (16 - 18)  SpO2: 95% (24 @ 04:21) (94% - 100%)    Gen: NAD, AOx3, resting comfortably on room air   Abdomen:  Soft, non-tender, non-distended  Incision: Clean/dry/intact with staples in place  Uterus:  Fundus firm below umbilicus  VE:  Expected lochia  Ext:  b/l LE non-tender                           17.3   14.66 )-----------( 137      ( 2024 07:20 )             49.2         136  |  97  |  11.5  ----------------------------<  67<L>  3.6   |  19.0<L>  |  0.75    Ca    9.7      2024 12:10    TPro  6.4<L>  /  Alb  3.8  /  TBili  0.3<L>  /  DBili  x   /  AST  22  /  ALT  22  /  AlkPhos  181<H>                              17.3   14.66 )-----------( 137      ( 2024 07:20 )             49.2

## 2024-02-25 NOTE — DISCHARGE NOTE OB - HOSPITAL COURSE
Patient admitted to L&D in early labor and then underwent a  delivery for persistent category II FHT. Post-op course was uncomplicated. Pain is well controlled with PRN medication. She has no difficulty with ambulation, voiding, or PO intake. Lab values and vital signs are within normal limits prior to discharge.

## 2024-02-25 NOTE — PROGRESS NOTE ADULT - SUBJECTIVE AND OBJECTIVE BOX
INTERVAL HPI/OVERNIGHT EVENTS:  33y Female s/p c section under labor epidural anesthesia with duramorph for post op analgesia on 02/24/24    Vital Signs Last 24 Hrs  T(C): 37.2 (25 Feb 2024 08:00), Max: 37.2 (24 Feb 2024 16:10)  T(F): 99 (25 Feb 2024 08:00), Max: 99 (24 Feb 2024 16:10)  HR: 91 (25 Feb 2024 08:00) (76 - 107)  BP: 118/76 (25 Feb 2024 08:00) (106/59 - 146/92)  BP(mean): --  RR: 18 (25 Feb 2024 08:00) (16 - 18)  SpO2: 96% (25 Feb 2024 08:00) (94% - 97%)    Parameters below as of 25 Feb 2024 08:00  Patient On (Oxygen Delivery Method): room air            Patient's overall anesthesia satisfaction: Positive    Patients pain is well controlled with IT duramorph    No respiratory events overnight    No pruritis at this time    Patient doing well     No headache      No residual numbness or weakness, sensory and motor function intact.    No anesthetic complications or complaints noted or reported          .

## 2024-02-25 NOTE — DISCHARGE NOTE OB - CARE PROVIDER_API CALL
Patricia Vargas  Obstetrics and Gynecology  [address] [place] [address]  Phone: [phone]  Fax: [phone]  Follow Up Time:    Patricia Vargas  Obstetrics and Gynecology  [address] [place] [id number]  Phone: [phone]  Fax: [phone]  Follow Up Time: 2 weeks

## 2024-02-26 LAB
HCT VFR BLD CALC: 34.8 % — SIGNIFICANT CHANGE UP (ref 34.5–45)
HGB BLD-MCNC: 12.2 G/DL — SIGNIFICANT CHANGE UP (ref 11.5–15.5)
MCHC RBC-ENTMCNC: 30.4 PG — SIGNIFICANT CHANGE UP (ref 27–34)
MCHC RBC-ENTMCNC: 35.1 GM/DL — SIGNIFICANT CHANGE UP (ref 32–36)
MCV RBC AUTO: 86.8 FL — SIGNIFICANT CHANGE UP (ref 80–100)
PLATELET # BLD AUTO: 126 K/UL — LOW (ref 150–400)
RBC # BLD: 4.01 M/UL — SIGNIFICANT CHANGE UP (ref 3.8–5.2)
RBC # FLD: 13.3 % — SIGNIFICANT CHANGE UP (ref 10.3–14.5)
WBC # BLD: 11.73 K/UL — HIGH (ref 3.8–10.5)
WBC # FLD AUTO: 11.73 K/UL — HIGH (ref 3.8–10.5)

## 2024-02-26 RX ADMIN — Medication 975 MILLIGRAM(S): at 02:03

## 2024-02-26 RX ADMIN — MAGNESIUM HYDROXIDE 30 MILLILITER(S): 400 TABLET, CHEWABLE ORAL at 20:31

## 2024-02-26 RX ADMIN — Medication 975 MILLIGRAM(S): at 14:19

## 2024-02-26 RX ADMIN — Medication 975 MILLIGRAM(S): at 08:16

## 2024-02-26 RX ADMIN — Medication 975 MILLIGRAM(S): at 20:31

## 2024-02-26 NOTE — PROGRESS NOTE ADULT - SUBJECTIVE AND OBJECTIVE BOX
SHARA MCWILLIAMS is a 33y  now POD#2 s/p primary  section at 40w1d gestation for nrFHT. Uncomplicated.    S:    No acute events overnight.   Patient has no complaints this AM.    O:    Vital Signs Last 24 Hrs  T(C): 36.9 (2024 03:53), Max: 37.2 (2024 08:00)  T(F): 98.5 (2024 03:53), Max: 99 (2024 08:00)  HR: 88 (2024 03:53) (76 - 92)  BP: 113/70 (2024 03:53) (107/72 - 118/76)             13.2   12.63 )-----------( 113      ( 2024 05:42 )             37.0         Gen: NAD, AOx3, resting comfortably on room air   Abdomen:  Soft, non-tender, non-distended  Incision: Clean/dry/intact with staples in place  Uterus:  Fundus firm below umbilicus  VE:  Expected lochia  Ext:  b/l LE non-tender

## 2024-02-26 NOTE — PROGRESS NOTE ADULT - ASSESSMENT
A/P:  SHARA MCWILLIAMS is a 33y  now POD#1 s/p primary  section at 40w1d gestation for nrFHT. Uncomplicated delivery.   -Vital signs stable  -Hgb: 17.3 -> 13.2  -Voiding, tolerating PO  -Advance care as tolerated   -Continue routine postpartum and postoperative care and education  -Healthy female infant  -DVT ppx: SCDs in place  -F/u coags labs  -Dispo: Anticipate discharge to home today pending attending approval.

## 2024-02-27 ENCOUNTER — TRANSCRIPTION ENCOUNTER (OUTPATIENT)
Age: 34
End: 2024-02-27

## 2024-02-27 VITALS
RESPIRATION RATE: 18 BRPM | SYSTOLIC BLOOD PRESSURE: 101 MMHG | DIASTOLIC BLOOD PRESSURE: 72 MMHG | HEART RATE: 68 BPM | TEMPERATURE: 98 F | OXYGEN SATURATION: 95 %

## 2024-02-27 PROCEDURE — 86850 RBC ANTIBODY SCREEN: CPT

## 2024-02-27 PROCEDURE — 85730 THROMBOPLASTIN TIME PARTIAL: CPT

## 2024-02-27 PROCEDURE — 59050 FETAL MONITOR W/REPORT: CPT

## 2024-02-27 PROCEDURE — 85610 PROTHROMBIN TIME: CPT

## 2024-02-27 PROCEDURE — 86780 TREPONEMA PALLIDUM: CPT

## 2024-02-27 PROCEDURE — 36415 COLL VENOUS BLD VENIPUNCTURE: CPT

## 2024-02-27 PROCEDURE — 85027 COMPLETE CBC AUTOMATED: CPT

## 2024-02-27 PROCEDURE — 85025 COMPLETE CBC W/AUTO DIFF WBC: CPT

## 2024-02-27 PROCEDURE — 80053 COMPREHEN METABOLIC PANEL: CPT

## 2024-02-27 PROCEDURE — 85384 FIBRINOGEN ACTIVITY: CPT

## 2024-02-27 PROCEDURE — 86900 BLOOD TYPING SEROLOGIC ABO: CPT

## 2024-02-27 PROCEDURE — 86901 BLOOD TYPING SEROLOGIC RH(D): CPT

## 2024-02-27 RX ORDER — IBUPROFEN 200 MG
600 TABLET ORAL EVERY 6 HOURS
Refills: 0 | Status: DISCONTINUED | OUTPATIENT
Start: 2024-02-27 | End: 2024-02-27

## 2024-02-27 RX ADMIN — Medication 975 MILLIGRAM(S): at 02:04

## 2024-02-27 RX ADMIN — Medication 600 MILLIGRAM(S): at 11:25

## 2024-02-27 RX ADMIN — Medication 975 MILLIGRAM(S): at 09:09

## 2024-02-27 RX ADMIN — Medication 600 MILLIGRAM(S): at 05:12

## 2024-02-27 NOTE — PROGRESS NOTE ADULT - SUBJECTIVE AND OBJECTIVE BOX
SHARA MCWILLIAMS is a 33y  now POD#3 s/p primary  section at 40w1d gestation for nrFHT. Uncomplicated.    S:    No acute events overnight.   Patient has no complaints this AM.    O:    Vital Signs Last 24 Hrs  T(C): 36.9 (2024 15:58), Max: 36.9 (2024 15:58)  T(F): 98.4 (2024 15:58), Max: 98.4 (2024 15:58)  HR: 74 (2024 15:58) (74 - 74)  BP: 110/72 (2024 15:58) (110/72 - 110/72)                       12.2   11.73 )-----------( 126      ( 2024 05:52 )             34.8       Gen: NAD, AOx3, resting comfortably on room air   Abdomen:  Soft, non-tender, non-distended  Incision: Clean/dry/intact with staples in place  Uterus:  Fundus firm below umbilicus  VE:  Expected lochia  Ext:  b/l LE non-tender

## 2024-02-27 NOTE — PROGRESS NOTE ADULT - ASSESSMENT
A/P:  SHARA MCWILLIAMS is a 33y  now POD#3 s/p primary  section at 40w1d gestation for nrFHT. Uncomplicated delivery.   -Vital signs stable  -Hgb: 17.3 -> 13.2  -Voiding, tolerating PO  -Advance care as tolerated   -Continue routine postpartum and postoperative care and education  -Healthy female infant at bedside  -DVT ppx: SCDs in place  -F/u coags labs  -Dispo: Anticipate discharge to home today pending attending approval.

## 2024-04-03 ENCOUNTER — OUTPATIENT (OUTPATIENT)
Dept: OUTPATIENT SERVICES | Facility: HOSPITAL | Age: 34
LOS: 1 days | Discharge: HOME | End: 2024-04-03

## 2024-04-03 DIAGNOSIS — D69.6 THROMBOCYTOPENIA, UNSPECIFIED: ICD-10-CM

## 2024-04-10 ENCOUNTER — APPOINTMENT (OUTPATIENT)
Dept: HEMATOLOGY ONCOLOGY | Facility: CLINIC | Age: 34
End: 2024-04-10
Payer: COMMERCIAL

## 2024-04-10 ENCOUNTER — RESULT REVIEW (OUTPATIENT)
Age: 34
End: 2024-04-10

## 2024-04-10 VITALS
OXYGEN SATURATION: 98 % | BODY MASS INDEX: 31.28 KG/M2 | TEMPERATURE: 98.5 F | HEART RATE: 67 BPM | SYSTOLIC BLOOD PRESSURE: 114 MMHG | DIASTOLIC BLOOD PRESSURE: 81 MMHG | HEIGHT: 62 IN | WEIGHT: 170 LBS

## 2024-04-10 DIAGNOSIS — D69.3 IMMUNE THROMBOCYTOPENIC PURPURA: ICD-10-CM

## 2024-04-10 LAB
BASOPHILS # BLD AUTO: 0.1 K/UL — SIGNIFICANT CHANGE UP (ref 0–0.2)
BASOPHILS NFR BLD AUTO: 1.3 % — SIGNIFICANT CHANGE UP (ref 0–2)
EOSINOPHIL # BLD AUTO: 0.4 K/UL — SIGNIFICANT CHANGE UP (ref 0–0.5)
EOSINOPHIL NFR BLD AUTO: 5.1 % — SIGNIFICANT CHANGE UP (ref 0–6)
HCT VFR BLD CALC: 43.5 % — SIGNIFICANT CHANGE UP (ref 34.5–45)
HGB BLD-MCNC: 15.2 G/DL — SIGNIFICANT CHANGE UP (ref 11.5–15.5)
LYMPHOCYTES # BLD AUTO: 2.7 K/UL — SIGNIFICANT CHANGE UP (ref 1–3.3)
LYMPHOCYTES # BLD AUTO: 31 % — SIGNIFICANT CHANGE UP (ref 13–44)
MCHC RBC-ENTMCNC: 29.6 PG — SIGNIFICANT CHANGE UP (ref 27–34)
MCHC RBC-ENTMCNC: 35 G/DL — SIGNIFICANT CHANGE UP (ref 32–36)
MCV RBC AUTO: 84.5 FL — SIGNIFICANT CHANGE UP (ref 80–100)
MONOCYTES # BLD AUTO: 0.5 K/UL — SIGNIFICANT CHANGE UP (ref 0–0.9)
MONOCYTES NFR BLD AUTO: 6 % — SIGNIFICANT CHANGE UP (ref 2–14)
NEUTROPHILS # BLD AUTO: 4.9 K/UL — SIGNIFICANT CHANGE UP (ref 1.8–7.4)
NEUTROPHILS NFR BLD AUTO: 56.6 % — SIGNIFICANT CHANGE UP (ref 43–77)
PLATELET # BLD AUTO: 120 K/UL — LOW (ref 150–400)
RBC # BLD: 5.15 M/UL — SIGNIFICANT CHANGE UP (ref 3.8–5.2)
RBC # FLD: 10.5 % — SIGNIFICANT CHANGE UP (ref 10.3–14.5)
WBC # BLD: 8.6 K/UL — SIGNIFICANT CHANGE UP (ref 3.8–10.5)
WBC # FLD AUTO: 8.6 K/UL — SIGNIFICANT CHANGE UP (ref 3.8–10.5)

## 2024-04-10 PROCEDURE — 99213 OFFICE O/P EST LOW 20 MIN: CPT

## 2024-04-10 NOTE — ADDENDUM
[FreeTextEntry1] : Documented by Holland Dukes acting as scribe for Dr. Burgess on  04/10/2024.   All Medical record entries made by the Scribe were at my, Dr. Burgess's, direction and personally dictated by me on  04/10/2024. I have reviewed the chart and agree that the record accurately reflects my personal performance of the history, physical exam, assessment and plan. I have also personally directed, reviewed, and agreed with the discharge instructions.

## 2024-04-10 NOTE — HISTORY OF PRESENT ILLNESS
[de-identified] :     Chronic ITP, with prior platelet counts 47,000 - in 6/17 58,000 - no bleeding or bruising.       [de-identified] :  No complications with  section on 24. Patient continues to do well and recovering without issues. Currently asymptomatic without abnormal bleeding.   24 platelet count 166K 24 platelet count 145K 4/10/24 platelet count 120K

## 2024-04-10 NOTE — ASSESSMENT
[FreeTextEntry1] : 33-year-old woman, due 24, with a history of chronic ITP.  Platelets 166K on 24. 24, platelets are 145K. Today's [id number] Patient had undergone a  section for delivery.    RTO in 6 months.

## 2024-08-15 ENCOUNTER — NON-APPOINTMENT (OUTPATIENT)
Age: 34
End: 2024-08-15

## 2024-10-14 ENCOUNTER — OUTPATIENT (OUTPATIENT)
Dept: OUTPATIENT SERVICES | Facility: HOSPITAL | Age: 34
LOS: 1 days | Discharge: HOME | End: 2024-10-14

## 2024-10-14 DIAGNOSIS — D69.6 THROMBOCYTOPENIA, UNSPECIFIED: ICD-10-CM

## 2024-10-16 ENCOUNTER — RESULT REVIEW (OUTPATIENT)
Age: 34
End: 2024-10-16

## 2024-10-16 ENCOUNTER — APPOINTMENT (OUTPATIENT)
Dept: HEMATOLOGY ONCOLOGY | Facility: CLINIC | Age: 34
End: 2024-10-16
Payer: COMMERCIAL

## 2024-10-16 ENCOUNTER — NON-APPOINTMENT (OUTPATIENT)
Age: 34
End: 2024-10-16

## 2024-10-16 VITALS
DIASTOLIC BLOOD PRESSURE: 29 MMHG | HEIGHT: 62 IN | BODY MASS INDEX: 29.72 KG/M2 | TEMPERATURE: 97.5 F | OXYGEN SATURATION: 99 % | WEIGHT: 161.5 LBS | HEART RATE: 79 BPM | SYSTOLIC BLOOD PRESSURE: 128 MMHG

## 2024-10-16 DIAGNOSIS — D69.3 IMMUNE THROMBOCYTOPENIC PURPURA: ICD-10-CM

## 2024-10-16 LAB
ANISOCYTOSIS BLD QL: SLIGHT — SIGNIFICANT CHANGE UP
BASOPHILS # BLD AUTO: 0.1 K/UL — SIGNIFICANT CHANGE UP (ref 0–0.2)
BASOPHILS NFR BLD AUTO: 2 % — SIGNIFICANT CHANGE UP (ref 0–2)
EOSINOPHIL # BLD AUTO: 0.3 K/UL — SIGNIFICANT CHANGE UP (ref 0–0.5)
EOSINOPHIL NFR BLD AUTO: 0 % — SIGNIFICANT CHANGE UP (ref 0–6)
HCT VFR BLD CALC: 46.6 % — HIGH (ref 34.5–45)
HGB BLD-MCNC: 15.8 G/DL — HIGH (ref 11.5–15.5)
LYMPHOCYTES # BLD AUTO: 1.8 K/UL — SIGNIFICANT CHANGE UP (ref 1–3.3)
LYMPHOCYTES # BLD AUTO: 35 % — SIGNIFICANT CHANGE UP (ref 13–44)
MACROCYTES BLD QL: SLIGHT — SIGNIFICANT CHANGE UP
MCHC RBC-ENTMCNC: 29.2 PG — SIGNIFICANT CHANGE UP (ref 27–34)
MCHC RBC-ENTMCNC: 33.9 G/DL — SIGNIFICANT CHANGE UP (ref 32–36)
MCV RBC AUTO: 86.2 FL — SIGNIFICANT CHANGE UP (ref 80–100)
MICROCYTES BLD QL: SLIGHT — SIGNIFICANT CHANGE UP
MONOCYTES # BLD AUTO: 0.3 K/UL — SIGNIFICANT CHANGE UP (ref 0–0.9)
MONOCYTES NFR BLD AUTO: 5 % — SIGNIFICANT CHANGE UP (ref 2–14)
NEUTROPHILS # BLD AUTO: 2.8 K/UL — SIGNIFICANT CHANGE UP (ref 1.8–7.4)
NEUTROPHILS NFR BLD AUTO: 58 % — SIGNIFICANT CHANGE UP (ref 43–77)
PLAT MORPH BLD: NORMAL — SIGNIFICANT CHANGE UP
PLATELET # BLD AUTO: 72 K/UL — LOW (ref 150–400)
POIKILOCYTOSIS BLD QL AUTO: SLIGHT — SIGNIFICANT CHANGE UP
RBC # BLD: 5.41 M/UL — HIGH (ref 3.8–5.2)
RBC # FLD: 10.9 % — SIGNIFICANT CHANGE UP (ref 10.3–14.5)
RBC BLD AUTO: SIGNIFICANT CHANGE UP
SPHEROCYTES BLD QL SMEAR: SLIGHT — SIGNIFICANT CHANGE UP
WBC # BLD: 5.3 K/UL — SIGNIFICANT CHANGE UP (ref 3.8–10.5)
WBC # FLD AUTO: 5.3 K/UL — SIGNIFICANT CHANGE UP (ref 3.8–10.5)

## 2024-10-16 PROCEDURE — 99213 OFFICE O/P EST LOW 20 MIN: CPT

## 2024-12-04 ENCOUNTER — RESULT REVIEW (OUTPATIENT)
Age: 34
End: 2024-12-04

## 2024-12-04 ENCOUNTER — APPOINTMENT (OUTPATIENT)
Dept: HEMATOLOGY ONCOLOGY | Facility: CLINIC | Age: 34
End: 2024-12-04
Payer: COMMERCIAL

## 2024-12-04 VITALS
SYSTOLIC BLOOD PRESSURE: 124 MMHG | OXYGEN SATURATION: 99 % | HEIGHT: 62 IN | BODY MASS INDEX: 28.52 KG/M2 | HEART RATE: 78 BPM | WEIGHT: 155 LBS | TEMPERATURE: 97.4 F | DIASTOLIC BLOOD PRESSURE: 85 MMHG

## 2024-12-04 DIAGNOSIS — D69.3 IMMUNE THROMBOCYTOPENIC PURPURA: ICD-10-CM

## 2024-12-04 LAB
BASOPHILS # BLD AUTO: 0.1 K/UL — SIGNIFICANT CHANGE UP (ref 0–0.2)
BASOPHILS NFR BLD AUTO: 1.6 % — SIGNIFICANT CHANGE UP (ref 0–2)
EOSINOPHIL # BLD AUTO: 0.3 K/UL — SIGNIFICANT CHANGE UP (ref 0–0.5)
EOSINOPHIL NFR BLD AUTO: 4 % — SIGNIFICANT CHANGE UP (ref 0–6)
HCT VFR BLD CALC: 49.5 % — HIGH (ref 34.5–45)
HGB BLD-MCNC: 17 G/DL — HIGH (ref 11.5–15.5)
LYMPHOCYTES # BLD AUTO: 2.5 K/UL — SIGNIFICANT CHANGE UP (ref 1–3.3)
LYMPHOCYTES # BLD AUTO: 30.4 % — SIGNIFICANT CHANGE UP (ref 13–44)
MCHC RBC-ENTMCNC: 28.6 PG — SIGNIFICANT CHANGE UP (ref 27–34)
MCHC RBC-ENTMCNC: 34.4 G/DL — SIGNIFICANT CHANGE UP (ref 32–36)
MCV RBC AUTO: 83.1 FL — SIGNIFICANT CHANGE UP (ref 80–100)
MONOCYTES # BLD AUTO: 0.4 K/UL — SIGNIFICANT CHANGE UP (ref 0–0.9)
MONOCYTES NFR BLD AUTO: 4.4 % — SIGNIFICANT CHANGE UP (ref 2–14)
NEUTROPHILS # BLD AUTO: 4.9 K/UL — SIGNIFICANT CHANGE UP (ref 1.8–7.4)
NEUTROPHILS NFR BLD AUTO: 59.6 % — SIGNIFICANT CHANGE UP (ref 43–77)
PLATELET # BLD AUTO: 91 K/UL — LOW (ref 150–400)
RBC # BLD: 5.96 M/UL — HIGH (ref 3.8–5.2)
RBC # FLD: 11 % — SIGNIFICANT CHANGE UP (ref 10.3–14.5)
WBC # BLD: 8.2 K/UL — SIGNIFICANT CHANGE UP (ref 3.8–10.5)
WBC # FLD AUTO: 8.2 K/UL — SIGNIFICANT CHANGE UP (ref 3.8–10.5)

## 2024-12-04 PROCEDURE — 99213 OFFICE O/P EST LOW 20 MIN: CPT

## 2025-05-16 ENCOUNTER — NON-APPOINTMENT (OUTPATIENT)
Age: 35
End: 2025-05-16